# Patient Record
Sex: MALE | Race: WHITE | NOT HISPANIC OR LATINO
[De-identification: names, ages, dates, MRNs, and addresses within clinical notes are randomized per-mention and may not be internally consistent; named-entity substitution may affect disease eponyms.]

---

## 2021-10-27 ENCOUNTER — TRANSCRIPTION ENCOUNTER (OUTPATIENT)
Age: 76
End: 2021-10-27

## 2022-03-23 ENCOUNTER — TRANSCRIPTION ENCOUNTER (OUTPATIENT)
Age: 77
End: 2022-03-23

## 2024-01-24 ENCOUNTER — TRANSCRIPTION ENCOUNTER (OUTPATIENT)
Age: 79
End: 2024-01-24

## 2024-01-24 ENCOUNTER — INPATIENT (INPATIENT)
Facility: HOSPITAL | Age: 79
LOS: 1 days | Discharge: HOME | DRG: 308 | End: 2024-01-26
Attending: INTERNAL MEDICINE | Admitting: INTERNAL MEDICINE
Payer: MEDICARE

## 2024-01-24 VITALS
RESPIRATION RATE: 20 BRPM | OXYGEN SATURATION: 98 % | SYSTOLIC BLOOD PRESSURE: 143 MMHG | DIASTOLIC BLOOD PRESSURE: 89 MMHG | WEIGHT: 149.91 LBS | HEART RATE: 150 BPM | TEMPERATURE: 98 F

## 2024-01-24 DIAGNOSIS — K08.409 PARTIAL LOSS OF TEETH, UNSPECIFIED CAUSE, UNSPECIFIED CLASS: Chronic | ICD-10-CM

## 2024-01-24 DIAGNOSIS — Z98.890 OTHER SPECIFIED POSTPROCEDURAL STATES: Chronic | ICD-10-CM

## 2024-01-24 DIAGNOSIS — I10 ESSENTIAL (PRIMARY) HYPERTENSION: ICD-10-CM

## 2024-01-24 DIAGNOSIS — E78.5 HYPERLIPIDEMIA, UNSPECIFIED: ICD-10-CM

## 2024-01-24 DIAGNOSIS — I48.91 UNSPECIFIED ATRIAL FIBRILLATION: ICD-10-CM

## 2024-01-24 LAB
ALBUMIN SERPL ELPH-MCNC: 3.7 G/DL — SIGNIFICANT CHANGE UP (ref 3.3–5)
ALP SERPL-CCNC: 87 U/L — SIGNIFICANT CHANGE UP (ref 40–120)
ALT FLD-CCNC: 19 U/L — SIGNIFICANT CHANGE UP (ref 10–45)
ANION GAP SERPL CALC-SCNC: 14 MMOL/L — SIGNIFICANT CHANGE UP (ref 5–17)
APTT BLD: 31.2 SEC — SIGNIFICANT CHANGE UP (ref 24.5–35.6)
AST SERPL-CCNC: 28 U/L — SIGNIFICANT CHANGE UP (ref 10–40)
BASOPHILS # BLD AUTO: 0.06 K/UL — SIGNIFICANT CHANGE UP (ref 0–0.2)
BASOPHILS NFR BLD AUTO: 1 % — SIGNIFICANT CHANGE UP (ref 0–2)
BILIRUB SERPL-MCNC: 0.4 MG/DL — SIGNIFICANT CHANGE UP (ref 0.2–1.2)
BUN SERPL-MCNC: 15 MG/DL — SIGNIFICANT CHANGE UP (ref 7–23)
CALCIUM SERPL-MCNC: 9.1 MG/DL — SIGNIFICANT CHANGE UP (ref 8.4–10.5)
CHLORIDE SERPL-SCNC: 106 MMOL/L — SIGNIFICANT CHANGE UP (ref 96–108)
CK MB CFR SERPL CALC: 4.5 NG/ML — SIGNIFICANT CHANGE UP (ref 0–6.7)
CK SERPL-CCNC: 119 U/L — SIGNIFICANT CHANGE UP (ref 30–200)
CO2 SERPL-SCNC: 20 MMOL/L — LOW (ref 22–31)
CREAT SERPL-MCNC: 1.03 MG/DL — SIGNIFICANT CHANGE UP (ref 0.5–1.3)
EGFR: 75 ML/MIN/1.73M2 — SIGNIFICANT CHANGE UP
EOSINOPHIL # BLD AUTO: 0.25 K/UL — SIGNIFICANT CHANGE UP (ref 0–0.5)
EOSINOPHIL NFR BLD AUTO: 4.1 % — SIGNIFICANT CHANGE UP (ref 0–6)
GLUCOSE SERPL-MCNC: 92 MG/DL — SIGNIFICANT CHANGE UP (ref 70–99)
HCT VFR BLD CALC: 43.3 % — SIGNIFICANT CHANGE UP (ref 39–50)
HGB BLD-MCNC: 13.9 G/DL — SIGNIFICANT CHANGE UP (ref 13–17)
IMM GRANULOCYTES NFR BLD AUTO: 0.3 % — SIGNIFICANT CHANGE UP (ref 0–0.9)
INR BLD: 1.06 — SIGNIFICANT CHANGE UP (ref 0.85–1.18)
LYMPHOCYTES # BLD AUTO: 1.88 K/UL — SIGNIFICANT CHANGE UP (ref 1–3.3)
LYMPHOCYTES # BLD AUTO: 31.1 % — SIGNIFICANT CHANGE UP (ref 13–44)
MAGNESIUM SERPL-MCNC: 2 MG/DL — SIGNIFICANT CHANGE UP (ref 1.6–2.6)
MCHC RBC-ENTMCNC: 27.6 PG — SIGNIFICANT CHANGE UP (ref 27–34)
MCHC RBC-ENTMCNC: 32.1 GM/DL — SIGNIFICANT CHANGE UP (ref 32–36)
MCV RBC AUTO: 86.1 FL — SIGNIFICANT CHANGE UP (ref 80–100)
MONOCYTES # BLD AUTO: 0.72 K/UL — SIGNIFICANT CHANGE UP (ref 0–0.9)
MONOCYTES NFR BLD AUTO: 11.9 % — SIGNIFICANT CHANGE UP (ref 2–14)
NEUTROPHILS # BLD AUTO: 3.11 K/UL — SIGNIFICANT CHANGE UP (ref 1.8–7.4)
NEUTROPHILS NFR BLD AUTO: 51.6 % — SIGNIFICANT CHANGE UP (ref 43–77)
NRBC # BLD: 0 /100 WBCS — SIGNIFICANT CHANGE UP (ref 0–0)
NT-PROBNP SERPL-SCNC: 986 PG/ML — HIGH (ref 0–300)
PLATELET # BLD AUTO: 199 K/UL — SIGNIFICANT CHANGE UP (ref 150–400)
POTASSIUM SERPL-MCNC: 4.7 MMOL/L — SIGNIFICANT CHANGE UP (ref 3.5–5.3)
POTASSIUM SERPL-SCNC: 4.7 MMOL/L — SIGNIFICANT CHANGE UP (ref 3.5–5.3)
PROT SERPL-MCNC: 6.7 G/DL — SIGNIFICANT CHANGE UP (ref 6–8.3)
PROTHROM AB SERPL-ACNC: 12.1 SEC — SIGNIFICANT CHANGE UP (ref 9.5–13)
RBC # BLD: 5.03 M/UL — SIGNIFICANT CHANGE UP (ref 4.2–5.8)
RBC # FLD: 12.9 % — SIGNIFICANT CHANGE UP (ref 10.3–14.5)
SODIUM SERPL-SCNC: 140 MMOL/L — SIGNIFICANT CHANGE UP (ref 135–145)
TROPONIN T, HIGH SENSITIVITY RESULT: 17 NG/L — SIGNIFICANT CHANGE UP (ref 0–51)
TROPONIN T, HIGH SENSITIVITY RESULT: 18 NG/L — SIGNIFICANT CHANGE UP (ref 0–51)
WBC # BLD: 6.04 K/UL — SIGNIFICANT CHANGE UP (ref 3.8–10.5)
WBC # FLD AUTO: 6.04 K/UL — SIGNIFICANT CHANGE UP (ref 3.8–10.5)

## 2024-01-24 PROCEDURE — 99291 CRITICAL CARE FIRST HOUR: CPT

## 2024-01-24 PROCEDURE — 99223 1ST HOSP IP/OBS HIGH 75: CPT

## 2024-01-24 PROCEDURE — 93010 ELECTROCARDIOGRAM REPORT: CPT | Mod: 76

## 2024-01-24 PROCEDURE — 71045 X-RAY EXAM CHEST 1 VIEW: CPT | Mod: 26

## 2024-01-24 RX ORDER — SODIUM CHLORIDE 9 MG/ML
1000 INJECTION INTRAMUSCULAR; INTRAVENOUS; SUBCUTANEOUS ONCE
Refills: 0 | Status: COMPLETED | OUTPATIENT
Start: 2024-01-24 | End: 2024-01-24

## 2024-01-24 RX ORDER — METOPROLOL TARTRATE 50 MG
25 TABLET ORAL EVERY 12 HOURS
Refills: 0 | Status: DISCONTINUED | OUTPATIENT
Start: 2024-01-24 | End: 2024-01-26

## 2024-01-24 RX ORDER — DILTIAZEM HCL 120 MG
60 CAPSULE, EXT RELEASE 24 HR ORAL ONCE
Refills: 0 | Status: COMPLETED | OUTPATIENT
Start: 2024-01-24 | End: 2024-01-24

## 2024-01-24 RX ORDER — INFLUENZA VIRUS VACCINE 15; 15; 15; 15 UG/.5ML; UG/.5ML; UG/.5ML; UG/.5ML
0.7 SUSPENSION INTRAMUSCULAR ONCE
Refills: 0 | Status: DISCONTINUED | OUTPATIENT
Start: 2024-01-24 | End: 2024-01-26

## 2024-01-24 RX ORDER — DILTIAZEM HCL 120 MG
20 CAPSULE, EXT RELEASE 24 HR ORAL ONCE
Refills: 0 | Status: COMPLETED | OUTPATIENT
Start: 2024-01-24 | End: 2024-01-24

## 2024-01-24 RX ORDER — ATORVASTATIN CALCIUM 80 MG/1
20 TABLET, FILM COATED ORAL AT BEDTIME
Refills: 0 | Status: DISCONTINUED | OUTPATIENT
Start: 2024-01-25 | End: 2024-01-26

## 2024-01-24 RX ORDER — LOSARTAN POTASSIUM 100 MG/1
50 TABLET, FILM COATED ORAL DAILY
Refills: 0 | Status: DISCONTINUED | OUTPATIENT
Start: 2024-01-25 | End: 2024-01-26

## 2024-01-24 RX ORDER — APIXABAN 2.5 MG/1
5 TABLET, FILM COATED ORAL EVERY 12 HOURS
Refills: 0 | Status: DISCONTINUED | OUTPATIENT
Start: 2024-01-24 | End: 2024-01-24

## 2024-01-24 RX ORDER — APIXABAN 2.5 MG/1
5 TABLET, FILM COATED ORAL ONCE
Refills: 0 | Status: COMPLETED | OUTPATIENT
Start: 2024-01-24 | End: 2024-01-24

## 2024-01-24 RX ORDER — ATORVASTATIN CALCIUM 80 MG/1
1 TABLET, FILM COATED ORAL
Refills: 0 | DISCHARGE

## 2024-01-24 RX ORDER — METOPROLOL TARTRATE 50 MG
25 TABLET ORAL EVERY 12 HOURS
Refills: 0 | Status: DISCONTINUED | OUTPATIENT
Start: 2024-01-24 | End: 2024-01-24

## 2024-01-24 RX ORDER — APIXABAN 2.5 MG/1
5 TABLET, FILM COATED ORAL EVERY 12 HOURS
Refills: 0 | Status: DISCONTINUED | OUTPATIENT
Start: 2024-01-25 | End: 2024-01-26

## 2024-01-24 RX ADMIN — Medication 60 MILLIGRAM(S): at 12:15

## 2024-01-24 RX ADMIN — Medication 30 MILLILITER(S): at 21:01

## 2024-01-24 RX ADMIN — Medication 20 MILLIGRAM(S): at 12:04

## 2024-01-24 RX ADMIN — SODIUM CHLORIDE 1000 MILLILITER(S): 9 INJECTION INTRAMUSCULAR; INTRAVENOUS; SUBCUTANEOUS at 12:15

## 2024-01-24 RX ADMIN — Medication 25 MILLIGRAM(S): at 19:05

## 2024-01-24 RX ADMIN — APIXABAN 5 MILLIGRAM(S): 2.5 TABLET, FILM COATED ORAL at 14:34

## 2024-01-24 NOTE — ED ADULT NURSE NOTE - OBJECTIVE STATEMENT
Patient PMH high cholesterol to the ED c/o exertional weakness and sob x 1 week. Presented to urgent care today and found in atrial fib, denies hx. Denies chest pain, speaking in complete sentences. Ambulatory, AAOX4, NAD.

## 2024-01-24 NOTE — ED PROVIDER NOTE - CARDIAC, MLM
Normal rate, regular rhythm.  Heart sounds S1, S2.  No murmurs, rubs or gallops. Tachycardic and irregular

## 2024-01-24 NOTE — PATIENT PROFILE ADULT - FALL HARM RISK - UNIVERSAL INTERVENTIONS
Bed in lowest position, wheels locked, appropriate side rails in place/Call bell, personal items and telephone in reach/Instruct patient to call for assistance before getting out of bed or chair/Non-slip footwear when patient is out of bed/[place] to call system/Physically safe environment - no spills, clutter or unnecessary equipment/Purposeful Proactive Rounding/Room/bathroom lighting operational, light cord in reach

## 2024-01-24 NOTE — ED PROVIDER NOTE - PSYCHIATRIC, MLM
Alert and oriented to person, place, time/situation. normal mood and affect. no apparent risk to self or others. Alert and oriented. normal mood and affect.

## 2024-01-24 NOTE — H&P ADULT - NSHPLABSRESULTS_GEN_ALL_CORE
LABS:                          13.9   6.04  )-----------( 199      ( 24 Jan 2024 12:08 )             43.3     01-24    140  |  106  |  15  ----------------------------<  92  4.7   |  20<L>  |  1.03    Ca    9.1      24 Jan 2024 12:08  Mg     2.0     01-24    TPro  6.7  /  Alb  3.7  /  TBili  0.4  /  DBili  x   /  AST  28  /  ALT  19  /  AlkPhos  87  01-24    LIVER FUNCTIONS - ( 24 Jan 2024 12:08 )  Alb: 3.7 g/dL / Pro: 6.7 g/dL / ALK PHOS: 87 U/L / ALT: 19 U/L / AST: 28 U/L / GGT: x           PT/INR - ( 24 Jan 2024 12:08 )   PT: 12.1 sec;   INR: 1.06          PTT - ( 24 Jan 2024 12:08 )  PTT:31.2 sec  Urinalysis Basic - ( 24 Jan 2024 12:08 )    Color: x / Appearance: x / SG: x / pH: x  Gluc: 92 mg/dL / Ketone: x  / Bili: x / Urobili: x   Blood: x / Protein: x / Nitrite: x   Leuk Esterase: x / RBC: x / WBC x   Sq Epi: x / Non Sq Epi: x / Bacteria: x      EKG (1/24/24): Aflutter @76 bpm

## 2024-01-24 NOTE — H&P ADULT - CARDIOVASCULAR
normal/regular rate and rhythm/S1 S2 present/no gallops/no rub/no murmur/no pedal edema/vascular S1 S2 present/no gallops/no rub/no murmur/no pedal edema/Irregularly irregular rhythm/vascular

## 2024-01-24 NOTE — PROGRESS NOTE ADULT - SUBJECTIVE AND OBJECTIVE BOX
Anesthesiology Consult    Pt is a 79yo M w/ a PMH of HTN, AFib with NKDA who presented to the ED with AFib RVR scheduled for cardioversion. Anesthesia consulted due to the patient's issue with anesthesia in the past. Pt reports after his R tibia surgery 40 years ago, pt was unable to move his whole body after surgery for a day including his upper and lower extremities. Pt also reports after a dental procedure where he received local he was unable to open his mouth for 2 weeks. He has had hernia surgery and other dental procedures without issues. Discussion had with the pt regarding anesthesia and it's safety. All questions and concerns addressed, and pt is amendable to MAC required for cardioversion.

## 2024-01-24 NOTE — H&P ADULT - PROBLEM SELECTOR PLAN 3
Follow up lipid panel   - Continue: Atorvastatin 20 mg QHS (home med)     DVT ppx: Eliquis 5 mg BID  F: s/p 1L NS in ED, tolerating PO intake, NPO for possible testing 1/25/24   E: Keep K > 4, Mg > 2  N: DASH/TLC     Code: Full  Dispo: pending clinical progression     Case discussed with Dr. Gary.

## 2024-01-24 NOTE — H&P ADULT - NS ATTEND AMEND GEN_ALL_CORE FT
78-year-old male, PMHx HTN, HLD, admitted for cardiac telemetry for treatment of arrhythmia.    1. Atrial flutter variable block  Presented symptomatic, responded to CCB, HR 70s, variable block. Discussed NAN/DDCV vs RFA. Does not want to have any anesthesia.  Consult anesthesia and EP.  High chadsvasc score, eliquis 5 mg bid.  Echo and CT JOSE protocol in AM if decides against anesthesia - if no thrombus amiodarone.  if ok with anesthesia, cancel ct, NAN/DCCV vs rfa.

## 2024-01-24 NOTE — ED PROVIDER NOTE - OBJECTIVE STATEMENT
78-year-old male with past medical history of hypertension [losartan], HLD [atorvastatin], denies any other medical history or medications, presents today with palpitations and dyspnea on exertion for 1 week that has gradually been worsening.  [facility] and was found to be in A-fib with RVR in 130s and was sent to [facility] ED for further evaluation. Denies any history of  chest atrial fibrillation or cardiac arrhythmia. Patient denies any chest pain, abd pain, fevers, nausea, vomiting, diarrhea, URI symptoms or any recent illness.  Patient denies any CAD or CHF.  Notes that he had a cardiac echo a few months ago that was normal.  No other complaints.

## 2024-01-24 NOTE — H&P ADULT - HISTORY OF PRESENT ILLNESS
78-year-old male, PMHx HTN, HLD, who was sent to [facility] ED in AFib with RVR. Patient reports that he has been experiencing intermittent palpitations and dyspnea on significant exertion over the last one week, prompting him to report to urgent care today. He was found to be in Afib with RVR to 130s bpm at urgent care and was sent to [facility] ED for further evaluation. Patient denies CP, SOB, dizziness, orthopnea/PND, leg swelling, LOC, bleeding, melena/hematochezia, fever, chills, URI symptoms, or recent illness. Patient reports that he follows with cardiologist Dr. Eden and underwent stress test/ECHO approximately six months ago, both of which were reportedly unremarkable. He does not recall history of a cardiac arrhythmia in the past.     ED Course:  - Vitals: 97.6F, HR 76 bpm, RR 17 breaths/min, /66, spO2 97% on  room air   - Diagnostics: EKG (1/24/24): Aflutter @76 bpm; CXR (1/24/24, prelim): cardiomegaly, otherwise no apparent congestion   - Labs: WBC 6.04, H/H 13.9/43.3, , Na 140, K 4.7, BUN/Cr 15/1.03   - Interventions: Cardizem 20 mg IVPx1, Cardizem 60 mg POx1, IV NS 1L bolus

## 2024-01-24 NOTE — ED ADULT TRIAGE NOTE - CHIEF COMPLAINT QUOTE
pt presents to ER c/o generalized weakness for the past week. denies any chest pain, dizziness, palpitations, sob, and fevers.

## 2024-01-24 NOTE — ED PROVIDER NOTE - CLINICAL SUMMARY MEDICAL DECISION MAKING FREE TEXT BOX
78-year-old male with past medical history of hypertension [losartan], HLD [atorvastatin], denies any other medical history or medications, presents today with palpitations and dyspnea on exertion for 1 week that has gradually been worsening.  [facility] and was found to be in A-fib with RVR in 130s and was sent to [facility] ED for further evaluation. Denies any history of  chest atrial fibrillation or cardiac arrhythmia. Patient denies any chest pain, abd pain, fevers, nausea, vomiting, diarrhea, URI symptoms or any recent illness.  Patient denies any CAD or CHF.  Notes that he had a cardiac echo a few months ago that was normal.  No other complaints.    ED course: 78-year-old male with past medical history of hypertension [losartan], HLD [atorvastatin], denies any other medical history or medications, presents today with palpitations and dyspnea on exertion for 1 week that has gradually been worsening.  [facility] and was found to be in A-fib with RVR in 130s and was sent to [facility] ED for further evaluation. Denies any history of  chest atrial fibrillation or cardiac arrhythmia. Patient denies any chest pain, abd pain, fevers, nausea, vomiting, diarrhea, URI symptoms or any recent illness.  Patient denies any CAD or CHF.  Notes that he had a cardiac echo a few months ago that was normal.  No other complaints.    ED course: VS noted. Pt tachycardic, afebrile. ECG with afib with RVR in 130s. Pt given IV and po Cardizem with HR improving to 80s, remained in afib. Eliquis given and patient admitted to cardiology. Labs/studies noted. 78-year-old male with past medical history of hypertension [losartan], HLD [atorvastatin], denies any other medical history or medications, presents today with palpitations and dyspnea on exertion for 1 week that has gradually been worsening.  [facility] and was found to be in A-fib with RVR in 130s and was sent to [facility] ED for further evaluation. Denies any history of  chest atrial fibrillation or cardiac arrhythmia. Patient denies any chest pain, abd pain, fevers, nausea, vomiting, diarrhea, URI symptoms or any recent illness.  Patient denies any CAD or CHF.  Notes that he had a cardiac echo a few months ago that was normal.  No other complaints.    ED course: VS noted. Pt tachycardic, afebrile. ECG with afib with RVR in 130s. Pt given IV and po Cardizem with HR improving to 80s, remained in afib. Labs/studies noted. Eliquis given and patient admitted to cardiology. Labs/studies noted.

## 2024-01-24 NOTE — H&P ADULT - PROBLEM SELECTOR PLAN 1
Patient p/w afib w/ RVR to 130s, currently rate-controlled to 80s, asymptomatic   - EKG (1/24/24): AFlutter @ 76 bpm, no apparent ischemic changes  - CXR (1/24/24, prelim): cardiomegaly; no apparent congestion   - Rate-control: Toprol XL Patient p/w new-onset Afib w/ RVR to 130s, currently rate-controlled to 80s, asymptomatic   - EKG (1/24/24): AFlutter @ 76 bpm, no apparent ischemic changes  - CXR (1/24/24, prelim): cardiomegaly; no apparent congestion   - Rate-control: Lopressor 25 mg BID  - Rhythm-control: pending anesthesia clearance for sedation for NAN/DCCV  - Anticoagulation: Eliquis 5 mg BID  - Follow up cardiac risk labs   - EP consulted, f/u recs   - Continue: telemetry monitoring, vital signs Patient p/w new-onset Afib w/ RVR to 130s, currently rate-controlled to 80s, asymptomatic   - EPRGe1CVBi: 3   - EKG (1/24/24): AFlutter @ 76 bpm, no apparent ischemic changes  - CXR (1/24/24, prelim): cardiomegaly; no apparent congestion   - Rate-control: Lopressor 25 mg BID  - Rhythm-control: pending anesthesia clearance for sedation for NAN/DCCV  - Anticoagulation: Eliquis 5 mg BID  - Follow up cardiac risk labs   - EP consulted, f/u recs   - Continue: telemetry monitoring, vital signs

## 2024-01-24 NOTE — H&P ADULT - ASSESSMENT
78-year-old male, PMHx HTN, HLD, admitted for cardiac telemetry for treatment of Afib w/ RVR. Pending anesthesia consult for sedation candidacy in light of unclear allergy.

## 2024-01-25 ENCOUNTER — TRANSCRIPTION ENCOUNTER (OUTPATIENT)
Age: 79
End: 2024-01-25

## 2024-01-25 LAB
ALBUMIN SERPL ELPH-MCNC: 4.2 G/DL — SIGNIFICANT CHANGE UP (ref 3.3–5)
ALP SERPL-CCNC: 93 U/L — SIGNIFICANT CHANGE UP (ref 40–120)
ALT FLD-CCNC: 21 U/L — SIGNIFICANT CHANGE UP (ref 10–45)
ANION GAP SERPL CALC-SCNC: 7 MMOL/L — SIGNIFICANT CHANGE UP (ref 5–17)
APTT BLD: 36.9 SEC — HIGH (ref 24.5–35.6)
AST SERPL-CCNC: 25 U/L — SIGNIFICANT CHANGE UP (ref 10–40)
BASOPHILS # BLD AUTO: 0.06 K/UL — SIGNIFICANT CHANGE UP (ref 0–0.2)
BASOPHILS NFR BLD AUTO: 1 % — SIGNIFICANT CHANGE UP (ref 0–2)
BILIRUB SERPL-MCNC: 0.6 MG/DL — SIGNIFICANT CHANGE UP (ref 0.2–1.2)
BUN SERPL-MCNC: 15 MG/DL — SIGNIFICANT CHANGE UP (ref 7–23)
CALCIUM SERPL-MCNC: 9.5 MG/DL — SIGNIFICANT CHANGE UP (ref 8.4–10.5)
CHLORIDE SERPL-SCNC: 109 MMOL/L — HIGH (ref 96–108)
CHOLEST SERPL-MCNC: 141 MG/DL — SIGNIFICANT CHANGE UP
CO2 SERPL-SCNC: 28 MMOL/L — SIGNIFICANT CHANGE UP (ref 22–31)
CREAT SERPL-MCNC: 1.08 MG/DL — SIGNIFICANT CHANGE UP (ref 0.5–1.3)
EGFR: 71 ML/MIN/1.73M2 — SIGNIFICANT CHANGE UP
EOSINOPHIL # BLD AUTO: 0.28 K/UL — SIGNIFICANT CHANGE UP (ref 0–0.5)
EOSINOPHIL NFR BLD AUTO: 4.6 % — SIGNIFICANT CHANGE UP (ref 0–6)
GLUCOSE SERPL-MCNC: 100 MG/DL — HIGH (ref 70–99)
HCT VFR BLD CALC: 45.7 % — SIGNIFICANT CHANGE UP (ref 39–50)
HCV AB S/CO SERPL IA: 0.03 S/CO — SIGNIFICANT CHANGE UP
HCV AB SERPL-IMP: SIGNIFICANT CHANGE UP
HDLC SERPL-MCNC: 53 MG/DL — SIGNIFICANT CHANGE UP
HGB BLD-MCNC: 14.5 G/DL — SIGNIFICANT CHANGE UP (ref 13–17)
IMM GRANULOCYTES NFR BLD AUTO: 0.3 % — SIGNIFICANT CHANGE UP (ref 0–0.9)
INR BLD: 1.23 — HIGH (ref 0.85–1.18)
LIPID PNL WITH DIRECT LDL SERPL: 76 MG/DL — SIGNIFICANT CHANGE UP
LYMPHOCYTES # BLD AUTO: 1.61 K/UL — SIGNIFICANT CHANGE UP (ref 1–3.3)
LYMPHOCYTES # BLD AUTO: 26.4 % — SIGNIFICANT CHANGE UP (ref 13–44)
MAGNESIUM SERPL-MCNC: 2.2 MG/DL — SIGNIFICANT CHANGE UP (ref 1.6–2.6)
MCHC RBC-ENTMCNC: 27.5 PG — SIGNIFICANT CHANGE UP (ref 27–34)
MCHC RBC-ENTMCNC: 31.7 GM/DL — LOW (ref 32–36)
MCV RBC AUTO: 86.6 FL — SIGNIFICANT CHANGE UP (ref 80–100)
MONOCYTES # BLD AUTO: 0.64 K/UL — SIGNIFICANT CHANGE UP (ref 0–0.9)
MONOCYTES NFR BLD AUTO: 10.5 % — SIGNIFICANT CHANGE UP (ref 2–14)
NEUTROPHILS # BLD AUTO: 3.5 K/UL — SIGNIFICANT CHANGE UP (ref 1.8–7.4)
NEUTROPHILS NFR BLD AUTO: 57.2 % — SIGNIFICANT CHANGE UP (ref 43–77)
NON HDL CHOLESTEROL: 88 MG/DL — SIGNIFICANT CHANGE UP
NRBC # BLD: 0 /100 WBCS — SIGNIFICANT CHANGE UP (ref 0–0)
PLATELET # BLD AUTO: 224 K/UL — SIGNIFICANT CHANGE UP (ref 150–400)
POTASSIUM SERPL-MCNC: 4.9 MMOL/L — SIGNIFICANT CHANGE UP (ref 3.5–5.3)
POTASSIUM SERPL-SCNC: 4.9 MMOL/L — SIGNIFICANT CHANGE UP (ref 3.5–5.3)
PROT SERPL-MCNC: 6.7 G/DL — SIGNIFICANT CHANGE UP (ref 6–8.3)
PROTHROM AB SERPL-ACNC: 13.9 SEC — HIGH (ref 9.5–13)
RBC # BLD: 5.28 M/UL — SIGNIFICANT CHANGE UP (ref 4.2–5.8)
RBC # FLD: 13.1 % — SIGNIFICANT CHANGE UP (ref 10.3–14.5)
SODIUM SERPL-SCNC: 144 MMOL/L — SIGNIFICANT CHANGE UP (ref 135–145)
TRIGL SERPL-MCNC: 62 MG/DL — SIGNIFICANT CHANGE UP
TSH SERPL-MCNC: 3.41 UIU/ML — SIGNIFICANT CHANGE UP (ref 0.27–4.2)
WBC # BLD: 6.11 K/UL — SIGNIFICANT CHANGE UP (ref 3.8–10.5)
WBC # FLD AUTO: 6.11 K/UL — SIGNIFICANT CHANGE UP (ref 3.8–10.5)

## 2024-01-25 PROCEDURE — 93010 ELECTROCARDIOGRAM REPORT: CPT

## 2024-01-25 PROCEDURE — 99233 SBSQ HOSP IP/OBS HIGH 50: CPT

## 2024-01-25 PROCEDURE — 93306 TTE W/DOPPLER COMPLETE: CPT | Mod: 26

## 2024-01-25 PROCEDURE — 92960 CARDIOVERSION ELECTRIC EXT: CPT

## 2024-01-25 PROCEDURE — 93312 ECHO TRANSESOPHAGEAL: CPT | Mod: 26

## 2024-01-25 PROCEDURE — 76377 3D RENDER W/INTRP POSTPROCES: CPT | Mod: 26

## 2024-01-25 PROCEDURE — 93325 DOPPLER ECHO COLOR FLOW MAPG: CPT | Mod: 26

## 2024-01-25 RX ORDER — APIXABAN 2.5 MG/1
1 TABLET, FILM COATED ORAL
Qty: 60 | Refills: 0
Start: 2024-01-25 | End: 2024-02-23

## 2024-01-25 RX ADMIN — LOSARTAN POTASSIUM 50 MILLIGRAM(S): 100 TABLET, FILM COATED ORAL at 05:54

## 2024-01-25 RX ADMIN — APIXABAN 5 MILLIGRAM(S): 2.5 TABLET, FILM COATED ORAL at 10:48

## 2024-01-25 RX ADMIN — APIXABAN 5 MILLIGRAM(S): 2.5 TABLET, FILM COATED ORAL at 23:52

## 2024-01-25 RX ADMIN — ATORVASTATIN CALCIUM 20 MILLIGRAM(S): 80 TABLET, FILM COATED ORAL at 23:52

## 2024-01-25 RX ADMIN — Medication 25 MILLIGRAM(S): at 05:54

## 2024-01-25 RX ADMIN — APIXABAN 5 MILLIGRAM(S): 2.5 TABLET, FILM COATED ORAL at 00:12

## 2024-01-25 NOTE — PROGRESS NOTE ADULT - PROBLEM SELECTOR PLAN 3
Continue: Atorvastatin 20 mg QHS      F: None  E: Replete if K<4 or Mag<2  N: DASH Diet  VTEppx: Eliquis  Dispo: Pending DCCV    Case discussed with Dr. Gary

## 2024-01-25 NOTE — DIETITIAN INITIAL EVALUATION ADULT - NS FNS DIET ORDER
Diet, NPO after Midnight:      NPO Start Date: 24-Jan-2024,   NPO Start Time: 23:59 (01-24-24 @ 17:16)  Diet, DASH/TLC:   Sodium & Cholesterol Restricted  Consistent Carbohydrate {No Snacks} (CSTCHO) (01-24-24 @ 16:21)

## 2024-01-25 NOTE — DISCHARGE NOTE PROVIDER - NSDCCPCAREPLAN_GEN_ALL_CORE_FT
PRINCIPAL DISCHARGE DIAGNOSIS  Diagnosis: Atrial fibrillation  Assessment and Plan of Treatment: You have an abnormal heart rhythm (arrhythmia) called atrial fibrillation. With this condition, the hearts 2 upper chambers (the atria) quiver rather than squeeze the blood out in a normal pattern. This leads to an irregular and sometimes rapid heartbeat. Atrial fibrillation is serious condition as it affects the heart’s ability to fill with blood, and the blood can start to form clots.  These clots can travel to the brain through the blood vessels, and cause strokes.    -Please CONTINUE  ELIQUIS 5MG TWICE A DAY to prevent a stroke by helping to prevent clots from forming.   -Please CONTINUE _____ TWICE A DAY to keep your heart rate regular  -Please CONTINUE _____ DAILY to keep your heart in normal rhythm  -Please follow up with  ___.        SECONDARY DISCHARGE DIAGNOSES  Diagnosis: HTN (hypertension)  Assessment and Plan of Treatment: Please continue ____ as listed to keep your blood pressure controlled. For blood pressure that is too high or too low please see your doctor or go to the emergency room as necessary.      Diagnosis: HLD (hyperlipidemia)  Assessment and Plan of Treatment: Please continue ____ at bedtime to keep your cholesterol low. High cholesterol contributes to heart disease.       PRINCIPAL DISCHARGE DIAGNOSIS  Diagnosis: Atrial fibrillation  Assessment and Plan of Treatment: You have an abnormal heart rhythm (arrhythmia) called atrial fibrillation. With this condition, the hearts 2 upper chambers (the atria) quiver rather than squeeze the blood out in a normal pattern. This leads to an irregular and sometimes rapid heartbeat. Atrial fibrillation is serious condition as it affects the heart’s ability to fill with blood, and the blood can start to form clots.  These clots can travel to the brain through the blood vessels, and cause strokes.    -Please CONTINUE  ELIQUIS 5MG TWICE A DAY to prevent a stroke by helping to prevent clots from forming.   -Please CONTINUE _____ TWICE A DAY to keep your heart rate regular  -Please CONTINUE _____ DAILY to keep your heart in normal rhythm  -Please follow up with  ___.        SECONDARY DISCHARGE DIAGNOSES  Diagnosis: HTN (hypertension)  Assessment and Plan of Treatment: Please continue ____ as listed to keep your blood pressure controlled. For blood pressure that is too high or too low please see your doctor or go to the emergency room as necessary.      Diagnosis: HLD (hyperlipidemia)  Assessment and Plan of Treatment: Please continue atorvastatin 20mg at bedtime to keep your cholesterol low. High cholesterol contributes to heart disease.       PRINCIPAL DISCHARGE DIAGNOSIS  Diagnosis: Atrial fibrillation  Assessment and Plan of Treatment: You have an abnormal heart rhythm (arrhythmia) called atrial fibrillation. With this condition, the hearts 2 upper chambers (the atria) quiver rather than squeeze the blood out in a normal pattern. This leads to an irregular and sometimes rapid heartbeat. Atrial fibrillation is serious condition as it affects the heart’s ability to fill with blood, and the blood can start to form clots.  These clots can travel to the brain through the blood vessels, and cause strokes.    You underwent a cardioversion and an ablation procedure to get you out of this abnormal rhythm and to ensure that you dont revert back to this abnormal rhythm.   -Please CONTINUE  ELIQUIS 5MG TWICE A DAY to prevent a stroke by helping to prevent clots from forming.   -Please CONTINUE _____ TWICE A DAY to keep your heart rate regular  -Please CONTINUE _____ DAILY to keep your heart in normal rhythm  -Please follow up with  ___.        SECONDARY DISCHARGE DIAGNOSES  Diagnosis: HTN (hypertension)  Assessment and Plan of Treatment: Please continue ____ as listed to keep your blood pressure controlled. For blood pressure that is too high or too low please see your doctor or go to the emergency room as necessary.      Diagnosis: HLD (hyperlipidemia)  Assessment and Plan of Treatment: Please continue atorvastatin 20mg at bedtime to keep your cholesterol low. High cholesterol contributes to heart disease.      Diagnosis: Mitral regurgitation  Assessment and Plan of Treatment:      PRINCIPAL DISCHARGE DIAGNOSIS  Diagnosis: Atrial fibrillation  Assessment and Plan of Treatment: You have an abnormal heart rhythm (arrhythmia) called atrial fibrillation. With this condition, the hearts 2 upper chambers (the atria) quiver rather than squeeze the blood out in a normal pattern. This leads to an irregular and sometimes rapid heartbeat. Atrial fibrillation is serious condition as it affects the heart’s ability to fill with blood, and the blood can start to form clots.  These clots can travel to the brain through the blood vessels, and cause strokes.    You underwent a cardioversion to get you out of this abnormal rhythm and to ensure that you dont revert back to this abnormal rhythm. You were advised ablation procedure but you declined to have more time to discuss this with your cardiologist.   -Please CONTINUE  ELIQUIS 5MG TWICE A DAY to prevent a stroke by helping to prevent clots from forming.   -Please start Metoprolol 25mg once a day to keep your heart rate controlled.   -Please follow up with Dr. Eden in 1-2 weeks to discuss further regarding ablation procedure and further options.        SECONDARY DISCHARGE DIAGNOSES  Diagnosis: HTN (hypertension)  Assessment and Plan of Treatment: Please continue your Losartan 50mg once a day and stary Toprol 25mg once a day as listed to keep your blood pressure controlled. For blood pressure that is too high or too low please see your doctor or go to the emergency room as necessary.      Diagnosis: HLD (hyperlipidemia)  Assessment and Plan of Treatment: Please continue atorvastatin 20mg at bedtime to keep your cholesterol low. High cholesterol contributes to heart disease.      Diagnosis: Mitral regurgitation  Assessment and Plan of Treatment: You have a known leaky heart valve and will benefit from seeing Structural team for valvular disease and further management. Please call to follow up with Dr. Magallanes in next 2 weeks for further evaluation and management.    Diagnosis: Cardiomyopathy  Assessment and Plan of Treatment: You have a weak heart, also known as Congestive Heart Failure (CHF). Heart failure is a condition in which the heart does not pump or fill with blood well. As a result, the heart lags behind in its job of moving blood throughout the body. This can lead to symptoms such as swelling, trouble breathing, and feeling tired. Your Ejection Fraction (EF) is 40-45%, a normal EF is 55-60%.  -Avoid drinking more than 1.5L of fluid daily and maintain a low salt diet (max 2grams daily).  -Please weigh yourself daily, for any significant increases in daily weight of 2lbs/day or 5lbs/week with associated swelling in the legs or abdomen and/or shortness of breath, please call your doctor or go to the emergency room.  -Follow up with  _________ in 1 week.       PRINCIPAL DISCHARGE DIAGNOSIS  Diagnosis: Atrial fibrillation  Assessment and Plan of Treatment: You have an abnormal heart rhythm (arrhythmia) called atrial fibrillation. With this condition, the hearts 2 upper chambers (the atria) quiver rather than squeeze the blood out in a normal pattern. This leads to an irregular and sometimes rapid heartbeat. Atrial fibrillation is serious condition as it affects the heart’s ability to fill with blood, and the blood can start to form clots.  These clots can travel to the brain through the blood vessels, and cause strokes.    You underwent a cardioversion to get you out of this abnormal rhythm and to ensure that you dont revert back to this abnormal rhythm. You were advised ablation procedure but you declined to have more time to discuss this with your cardiologist.   -Please CONTINUE  ELIQUIS 5MG TWICE A DAY to prevent a stroke by helping to prevent clots from forming.   -Please start Metoprolol Succinate 50mg once a day to keep your heart rate controlled.   -Please follow up with Dr. Eden in 1-2 weeks to discuss further regarding ablation procedure and further options.        SECONDARY DISCHARGE DIAGNOSES  Diagnosis: HTN (hypertension)  Assessment and Plan of Treatment: Please stop Losartan and start Entresto 24/26mg twice a day once a day and start Metoprolol 50mg once a day as listed to keep your blood pressure controlled. For blood pressure that is too high or too low please see your doctor or go to the emergency room as necessary.      Diagnosis: HLD (hyperlipidemia)  Assessment and Plan of Treatment: Please continue atorvastatin 20mg at bedtime to keep your cholesterol low. High cholesterol contributes to heart disease.      Diagnosis: Mitral regurgitation  Assessment and Plan of Treatment: You have a known leaky heart valve and will benefit from seeing Structural team for valvular disease and further management. Please call to follow up with Dr. Magallanes in next 2 weeks for further evaluation and management.    Diagnosis: Cardiomyopathy  Assessment and Plan of Treatment: You have a weak heart, also known as Congestive Heart Failure (CHF). Heart failure is a condition in which the heart does not pump or fill with blood well. As a result, the heart lags behind in its job of moving blood throughout the body. This can lead to symptoms such as swelling, trouble breathing, and feeling tired. Your Ejection Fraction (EF) is 40-45%, a normal EF is 55-60%.  Please start Entresto 24/26mg twice a day, Farxiga 10mg once a day, and Metoprolol 50mg once a day.   -Avoid drinking more than 1.5L of fluid daily and maintain a low salt diet (max 2grams daily).  -Please weigh yourself daily, for any significant increases in daily weight of 2lbs/day or 5lbs/week with associated swelling in the legs or abdomen and/or shortness of breath, please call your doctor or go to the emergency room.  -Follow up with Dr. Eden in 1-2 week.

## 2024-01-25 NOTE — DIETITIAN INITIAL EVALUATION ADULT - PERSON TAUGHT/METHOD
- Educated about heart Reviewed general healthful diet. diet, provided handout.  - Educated and counseled patient, devising different approaches to avoid unhealthy snacking.  - Imparted education about healthy snack options./verbal instruction/written material/patient instructed - Educated about heart-healthy diet, provided handout.  - Educated and counseled patient, devising different approaches to avoid unhealthy snacking.  - Imparted education about healthy snack options./verbal instruction/written material/patient instructed - Educated about heart-healthy diet, provided handout. Reviewed general healthful diet.   - Educated and counseled patient, devising different approaches to avoid unhealthy snacking.  - Imparted education about healthy snack options./verbal instruction/written material/patient instructed

## 2024-01-25 NOTE — DIETITIAN INITIAL EVALUATION ADULT - OTHER CALCULATIONS
Based on Standards of Care pt within % IBW (  pounds, 120%) thus actual body weight used for all calculations. Needs adjusted for advanced age.  Based on Standards of Care pt within % IBW (  pounds, 120%) thus actual body weight used for all calculations. Needs adjusted for advanced age. Recommend upper to higher end of the estimated calorie needs.

## 2024-01-25 NOTE — DIETITIAN INITIAL EVALUATION ADULT - PERTINENT MEDS FT
MEDICATIONS  (STANDING):  apixaban 5 milliGRAM(s) Oral every 12 hours  atorvastatin 20 milliGRAM(s) Oral at bedtime  influenza  Vaccine (HIGH DOSE) 0.7 milliLiter(s) IntraMuscular once  losartan 50 milliGRAM(s) Oral daily  metoprolol tartrate 25 milliGRAM(s) Oral every 12 hours    MEDICATIONS  (PRN):

## 2024-01-25 NOTE — DISCHARGE NOTE PROVIDER - CARE PROVIDER_API CALL
ORAL KRISHNAN  [address]  [place] [address]  Phone: [phone]  Fax: ()-  Follow Up Time: 2 weeks   ORAL KRISHNAN  [address]  [place] [address]  Phone: ([phone]  Fax: ()-  Follow Up Time: 2 weeks    Remberto Valentine  Cardiac Electrophysiology  [address] [place] [address]  Phone: [phone]  Fax: [phone]  Follow Up Time: 2 weeks   ORAL KRISHNAN  [address]  [place] [address]  Phone: ()-  Fax: ()-  Follow Up Time: 2 weeks    Remberto Valentine  Cardiac Electrophysiology  [address] [place] [address]  Phone: [phone]  Fax: [phone]  Follow Up Time: 2 weeks    Maxi Magallanes  Interventional Cardiology  [address]  [place] [address]  Phone: [phone]  Fax: [phone]  Follow Up Time: 2 weeks

## 2024-01-25 NOTE — DIETITIAN INITIAL EVALUATION ADULT - OTHER INFO
78-year-old male, PMHx HTN, HLD, admitted for cardiac telemetry for treatment of Afib w/ RVR. EP consulted, possible CT LA r/o thrombus and DCCV thereafter.     Patient seen at bedside for blank assessment. Current diet order: blank. NKFA. No difficulty chewing/swallowing reported. Reports blank appetite. Pt consumed blank % of breakfast which included blank. Food recall PTA. Nutrition education. Dosing weight: blank pounds, BMI, reports UBW of blank pounds. No pressure injuries documented. No edema documented. Denies N/V/D/C. Labs. Meds: blank. Observed with no overt signs and symptoms of muscle or fat wasting. Based on ASPEN guidelines, pt does not meet criteria for malnutrition. No cultural, ethnic, Hinduism food preferences noted. See nutrition recommendations below.  78-year-old male, PMHx HTN, HLD, who was sent to [facility] ED in AFib with RVR. Patient reports that he has been experiencing intermittent palpitations and dyspnea on significant exertion over the last one week, prompting him to report to urgent care today. He was found to be in Afib with RVR to 130s bpm at urgent care and was sent to [facility] ED for further evaluation.    Patient seen at bedside for nutrition assessment. Current diet order: DASH, consistent carbohydrate, NPO after midnight. Reports no known food allergies. No difficulty chewing/swallowing reported. Reports good appetite, consumed % of dinner last night which included hummus, servando platter, apple juice and apple tart. PTA, patient reports a good appetite, and his usual meals consist of pizza, salad, chicken, pasta, sausage, cheese burger, fish, cereal and cashew/almond/oat milk. Patient denies any intolerance for regular milk being the reason for his choice of milk alternatives. Dosing weight: 150 pounds, BMI 26.6, reports UBW of 150 pounds. Patient concerns his weight to have increased last couple of months, though same is not suggested by weights documented. No pressure injuries or edema documented. Denies N/V/D/C, last BM reported morning 01/24. Labs: Nutrition pertinent labs WNL, not tested for serum lipids (patient denied venipuncture). Meds: diltiazem, antacids. Observed with no overt signs and symptoms of muscle or fat wasting. Based on ASPEN guidelines, pt does not meet criteria for malnutrition. No cultural, ethnic, Sikhism food preferences noted. See nutrition recommendations below. 78-year-old male, PMHx HTN, HLD, who was sent to [facility] ED in AFib with RVR. Patient reports that he has been experiencing intermittent palpitations and dyspnea on significant exertion over the last one week, prompting him to report to urgent care today. He was found to be in Afib with RVR to 130s bpm at urgent care and was sent to [facility] ED for further evaluation.    Patient seen at bedside for nutrition assessment. Patient NPO (overnight, 1/24) at the time of assessment. Reports no known food allergies. No difficulty chewing/swallowing reported. Reports good appetite, consumed % of dinner last night which included hummus, servando platter, apple juice and apple tart. PTA, patient reports a good appetite, and his usual meals consist of pizza, salad, chicken, pasta, sausage, cheese burger, fish, cereal and cashew/almond/oat milk. Patient denies any intolerance for regular milk being the reason for his choice of milk alternatives. Patient reports binge cookies and unhealthy snacks lying around the house, which he finds difficult to convince his spouse against. Dosing weight: 150 pounds, BMI 26.6, reports UBW of 150 pounds. Patient concerns his weight to have increased last couple of months, though same is not suggested by weights documented. No pressure injuries or edema documented. Denies N/V/D/C, last BM reported morning 01/24. Labs: Nutrition pertinent labs WNL, not tested for serum lipids (patient denied venipuncture). Meds: diltiazem, antacids. Observed with no overt signs and symptoms of muscle or fat wasting. Based on ASPEN guidelines, pt does not meet criteria for malnutrition. No cultural, ethnic, Gnosticist food preferences noted. See nutrition recommendations below. 78-year-old male, PMHx HTN, HLD, who was sent to [facility] ED in AFib with RVR. Patient reports that he has been experiencing intermittent palpitations and dyspnea on significant exertion over the last one week, prompting him to report to urgent care today. He was found to be in Afib with RVR to 130s bpm at urgent care and was sent to [facility] ED for further evaluation.    Patient seen at bedside for nutrition assessment. Patient NPO@12 (1/24) at the time of assessment. Reports good appetite, consumed % of dinner last night which included hummus, servando platter, apple juice and apple tart. PTA, patient reports a good appetite, and his usual meals consist of pizza, salad, chicken, pasta, sausage, cheese burger, fish, cereal and cashew/almond/oat milk. Patient denies any intolerance for regular milk being the reason for his choice of milk alternatives. Reports no known food allergies. No difficulty chewing/swallowing reported. Patient sometimes overeating cookies and unhealthy snacks lying around the house per spouse preference. Dosing weight: 149 pounds, BMI 26.6, reports UBW of 150 pounds. Patient concerned for weight gain x last couple of months, though same is not suggested by weights documented. No pressure injuries or edema documented. Denies N/V/D/C, last BM reported morning 01/24. Labs: Nutrition pertinent labs WNL, not tested for serum lipids (patient denied venipuncture). Meds: diltiazem, antacids. Observed with no overt signs and symptoms of muscle or fat wasting. Based on ASPEN guidelines, pt does not meet criteria for malnutrition. No cultural, ethnic, Nondenominational food preferences noted.  See nutrition recommendations below.

## 2024-01-25 NOTE — DISCHARGE NOTE PROVIDER - NSDCFUADDAPPT_GEN_ALL_CORE_FT
Please follow up with your cardiologist Dr. Eden in 1-2 weeks after discharge.  Please follow up with EP Dr. Valentine in 2 weeks after discharge.     Please follow up with your cardiologist Dr. Eden in 1-2 weeks after discharge.  Please follow up with EP Dr. Valentine in 2 weeks after discharge.     Please follow up with your cardiologist Dr. Eden in 1-2 weeks after discharge.     Please

## 2024-01-25 NOTE — DIETITIAN INITIAL EVALUATION ADULT - DIET TYPE
Per serum glucose levels, carbohydrate consistent diet is not warranted/DASH/TLC (sodium and cholesterol restricted diet)/1000ml Per serum glucose levels, carbohydrate consistent diet is not warranted/DASH/TLC (sodium and cholesterol restricted diet)

## 2024-01-25 NOTE — PROGRESS NOTE ADULT - NS ATTEND AMEND GEN_ALL_CORE FT
78-year-old male, PMHx HTN, HLD, admitted for cardiac telemetry for treatment of arrhythmia.    1. Atrial flutter variable block  s/p NAN DCCV with restoration of sinus rhythm.  Continue eliquis 5 mg bid.  RFA tomorrow.    2. HFrEF, acute  Newly diagnosed HFrEF, possibly tachy-mediated but may have a component of MR. Suggest ischemic evaluation, initiation of GDMT.    3. Severe MR  Moderate to severe MR on NAN, appears mixed etiology with some component of degenerative MR and atrial dilation (atrial MR).   Plan to start GDMT, consult [facility] as outpatient if no improvement for consideration of MILENA MVR -not urgent.

## 2024-01-25 NOTE — CHART NOTE - NSCHARTNOTEFT_GEN_A_CORE
[facility] BRIEF OP NOTE    JAIRO LUNDBERG  [id number]    PROCEDURE:  - DCCV    INDICATION:  - AF    ELECTROPHYSIOLOGIST(S):  - Dr. Valentine (Attending)  - Dr. Castro (Fellow)    ANESTHESIOLOGY:  - Dr. Romero    SEDATION TYPE:  - MAC    FINDINGS:  - Successful cardioversion to sinus rhythm following NAN guided clearing of the JOSE with a single synchronized shock at 120J    COMPLICATIONS:  - None    RECOMMENDATIONS:  - Continue uninterrupted anticoagulation      Please refer to the full report to follow in [facility] [place] for a detailed description of this case.    --  Inder Castro MD  Electrophysiology PGY8

## 2024-01-25 NOTE — DIETITIAN INITIAL EVALUATION ADULT - SIGNS/SYMPTOMS
as evidenced by reported environment and behaviors which prevent a healthy diet  as evidenced by verbal reports as evidenced by verbal reports, Diet Recall

## 2024-01-25 NOTE — DISCHARGE NOTE PROVIDER - NSDCMRMEDTOKEN_GEN_ALL_CORE_FT
atorvastatin 20 mg oral tablet: 1 tab(s) orally once a day (at bedtime)  losartan 50 mg oral tablet: 1 tab(s) orally once a day   atorvastatin 20 mg oral tablet: 1 tab(s) orally once a day (at bedtime)  Eliquis 5 mg oral tablet: 1 tab(s) orally 2 times a day  losartan 50 mg oral tablet: 1 tab(s) orally once a day   atorvastatin 20 mg oral tablet: 1 tab(s) orally once a day (at bedtime)  Eliquis 5 mg oral tablet: 1 tab(s) orally 2 times a day  Entresto 24 mg-26 mg oral tablet: 1 tab(s) orally 2 times a day  Farxiga 10 mg oral tablet: 1 tab(s) orally once a day  losartan 50 mg oral tablet: 1 tab(s) orally once a day   atorvastatin 20 mg oral tablet: 1 tab(s) orally once a day (at bedtime)  Eliquis 5 mg oral tablet: 1 tab(s) orally 2 times a day  Entresto 24 mg-26 mg oral tablet: 1 tab(s) orally 2 times a day  Farxiga 10 mg oral tablet: 1 tab(s) orally once a day  Toprol-XL 50 mg oral tablet, extended release: 1 tab(s) orally once a day

## 2024-01-25 NOTE — DISCHARGE NOTE PROVIDER - HOSPITAL COURSE
INCOMPLETE       ***INCOMPLETE    78-year-old male, PMHx HTN, HLD, who was sent to [facility] ED in AFib with RVR. Patient reports that he has been experiencing intermittent palpitations and dyspnea on significant exertion over the last one week, prompting him to report to urgent care today. He was found to be in Afib with RVR to 130s bpm at urgent care and was sent to [facility] ED for further evaluation. Patient reports that he follows with cardiologist Dr. Eden and underwent stress test/ECHO approximately six months ago, both of which were reportedly unremarkable. He does not recall history of a cardiac arrhythmia in the past. EKG 1/24/24 revealed aflutter @76 bpm, CXR 1/24/24 revealed cardiomegaly, otherwise no apparent congestion. In ED, labs significant for: WBC 6.04, H/H 13.9/43.3, , Na 140, K 4.7, BUN/Cr 15/1.03. Patient was given Cardizem 20 mg IVPx1, Cardizem 60 mg POx1, IV NS 1L bolus in ED.  Pt receiving Lopressor 25 mg BID for rate control and Eliquis 5mg BID for anticoagulation. Patient reports hx of allergic rxn to general and local anesthesia where he was unable to move for 1 day after receiving it - patient unsure which medications caused this. Anesthesia consulted, pt cleared for NAN & DCCV with EP 1/25/24.        ***INCOMPLETE    78-year-old male, PMHx HTN, HLD, who was sent to [facility] ED in AFib with RVR. Patient reports that he has been experiencing intermittent palpitations and dyspnea on significant exertion over the last one week, prompting him to report to urgent care today. He was found to be in Afib with RVR to 130s bpm at urgent care and was sent to [facility] ED for further evaluation. Patient reports that he follows with cardiologist Dr. Eden and underwent stress test/ECHO approximately six months ago, both of which were reportedly unremarkable. He does not recall history of a cardiac arrhythmia in the past. EKG 1/24/24 revealed aflutter @76 bpm, CXR 1/24/24 revealed cardiomegaly, otherwise no apparent congestion. Patient was given Cardizem 20 mg IVPx1, Cardizem 60 mg POx1, IV NS 1L bolus in ED and admitted to cardiac tele for further management of afib w RVR. Pt was rate controlled with Lopressor 25mg BID and initiated on Eliquis 5mg BID for AC. Pt reports of a remote hx of adverse reaction to general and local anesthesia (unsure what medications causes this) for which anesthesiology was consulted w no contraindications to undego MAC for NAN/DCCV. Pt is now s/p NAN/DCCV on 1/25/24 ___. TTE 1/25/24: Mildly reduced LVSF EF 40-45%. Normal RV size/fctn. Severely dilated RA. Dilated RA. Mod eccentric MR. PAST 42 mmHg.        ***INCOMPLETE    78-year-old male, PMHx HTN, HLD, who was sent to [facility] ED in AFib with RVR. Patient reports that he has been experiencing intermittent palpitations and dyspnea on significant exertion over the last one week, prompting him to report to urgent care today. He was found to be in Afib with RVR to 130s bpm at urgent care and was sent to [facility] ED for further evaluation. Patient reports that he follows with cardiologist Dr. Eden and underwent stress test/ECHO approximately six months ago, both of which were reportedly unremarkable. He does not recall history of a cardiac arrhythmia in the past. EKG 1/24/24 revealed aflutter @76 bpm, CXR 1/24/24 revealed cardiomegaly, otherwise no apparent congestion. Patient was given Cardizem 20 mg IVPx1, Cardizem 60 mg POx1, IV NS 1L bolus in ED and admitted to cardiac tele for further management of afib w RVR. Pt was rate controlled with Lopressor 25mg BID and initiated on Eliquis 5mg BID for AC. Pt reports of a remote hx of adverse reaction to general and local anesthesia (unsure what medications causes this) for which anesthesiology was consulted w no contraindications to undergo MAC for NAN/DCCV. Pt is now s/p NAN/DCCV on 1/25/24 and subsequent ablation on 1/23/24.      TTE 1/25/24: Mildly reduced LVSF EF 40-45%. Normal RV size/fctn. Severely dilated RA. Dilated RA. Mod eccentric MR. PAST 42 mmHg. NAN 1/25/24: Mildly reduced LVSF. ELIJAH. No LA/RA/JOSE/RAA thrombus seen. Mod-severe eccentric posteriorly directed MR. Mild-mod TR. PASP 43 mmHg. Mod non-mobile plaque seen in the visualized portion of the descending aorta + aortic arch. HF consulted for GDMT guidance. Structural consulted for new mod-severe MR.      78-year-old male, PMHx HTN, HLD, who was sent to [facility] ED in AFib with RVR. Patient reports that he has been experiencing intermittent palpitations and dyspnea on significant exertion over the last one week, prompting him to report to urgent care today. He was found to be in Afib with RVR to 130s bpm at urgent care and was sent to [facility] ED for further evaluation. Patient reports that he follows with cardiologist Dr. Eden and underwent stress test/ECHO approximately six months ago, both of which were reportedly unremarkable. He does not recall history of a cardiac arrhythmia in the past. EKG 1/24/24 revealed aflutter @76 bpm, CXR 1/24/24 revealed cardiomegaly, otherwise no apparent congestion. Patient was given Cardizem 20 mg IVPx1, Cardizem 60 mg POx1, IV NS 1L bolus in ED and admitted to cardiac tele for further management of afib w RVR. Pt was rate controlled with Lopressor 25mg BID and initiated on Eliquis 5mg BID for AC. Pt reports of a remote hx of adverse reaction to general and local anesthesia (unsure what medications causes this) for which anesthesiology was consulted w no contraindications to undergo MAC for NAN/DCCV. Pt is now s/p NAN/DCCV on 1/25/24 and subsequent ablation on 1/23/24.      TTE 1/25/24: Mildly reduced LVSF EF 40-45%. Normal RV size/fctn. Severely dilated RA. Dilated RA. Mod eccentric MR. PAST 42 mmHg. NAN 1/25/24: Mildly reduced LVSF. ELIJAH. No LA/RA/JOSE/RAA thrombus seen. Mod-severe eccentric posteriorly directed MR. Mild-mod TR. PASP 43 mmHg. Mod non-mobile plaque seen in the visualized portion of the descending aorta + aortic arch. HF consulted for GDMT guidance. Structural consulted for new mod-severe MR.....     78-year-old male, PMHx HTN, HLD, who was sent to [facility] ED in AFib with RVR. Patient reports that he has been experiencing intermittent palpitations and dyspnea on significant exertion over the last one week, prompting him to report to urgent care today. He was found to be in Afib with RVR to 130s bpm at urgent care and was sent to [facility] ED for further evaluation. Patient reports that he follows with cardiologist Dr. Eden and underwent stress test/ECHO approximately six months ago, both of which were reportedly unremarkable. He does not recall history of a cardiac arrhythmia in the past. EKG 1/24/24 revealed aflutter @76 bpm, CXR 1/24/24 revealed cardiomegaly, otherwise no apparent congestion. Patient was given Cardizem 20 mg IVPx1, Cardizem 60 mg POx1, IV NS 1L bolus in ED and admitted to cardiac tele for further management of afib w RVR. Pt was rate controlled with Lopressor 25mg BID and initiated on Eliquis 5mg BID for AC. Pt reports of a remote hx of adverse reaction to general and local anesthesia (unsure what medications causes this) for which anesthesiology was consulted w no contraindications to undergo MAC for NAN/DCCV. Pt is now s/p NAN/DCCV on 1/25/24 and subsequent ablation on 1/23/24.      TTE 1/25/24: Mildly reduced LVSF EF 40-45%. Normal RV size/fctn. Severely dilated RA. Dilated RA. Mod eccentric MR. PAST 42 mmHg. NAN 1/25/24: Mildly reduced LVSF. ELIJAH. No LA/RA/JOSE/RAA thrombus seen. Mod-severe eccentric posteriorly directed MR. Mild-mod TR. PASP 43 mmHg. Mod non-mobile plaque seen in the visualized portion of the descending aorta + aortic arch.     Patient was initially scheduled for afib ablation procedure on 1/26/24, however, at this time he declines it and prefers to go home today. He prefers to have a discussion with his outpatient cardiologist before deciding anything further. Discussed case with EP and was advised verbal recs to change Lopressor to Toprol 25mg qd and continue NOAC. He can follow up outpatient with EP in 2-3 weeks.    Given his MR on echo, Structural was also called and was advised verbally, patient can follow-up outpatient as he is clinically stable and eager to be discharged. He will follow up with Dr. Magallanes in next 2-3 weeks.    Given his low EF and valvular disease including MR, HF was also called and advised ______________    Pt seen and examined at bedside this AM without any complaints or events overnight, VSS, labs and telemetry reviewed and pt stable for discharge as discussed with Dr. Gary. Pt has received appropriate discharge instructions, including medication regimen, access site management and follow up with  in 1-2 weeks.               78-year-old male, PMHx HTN, HLD, who was sent to [facility] ED in AFib with RVR. Patient reports that he has been experiencing intermittent palpitations and dyspnea on significant exertion over the last one week, prompting him to report to urgent care today. He was found to be in Afib with RVR to 130s bpm at urgent care and was sent to [facility] ED for further evaluation. Patient reports that he follows with cardiologist Dr. Eden and underwent stress test/ECHO approximately six months ago, both of which were reportedly unremarkable. He does not recall history of a cardiac arrhythmia in the past. EKG 1/24/24 revealed aflutter @76 bpm, CXR 1/24/24 revealed cardiomegaly, otherwise no apparent congestion. Patient was given Cardizem 20 mg IVPx1, Cardizem 60 mg POx1, IV NS 1L bolus in ED and admitted to cardiac tele for further management of afib w RVR. Pt was rate controlled with Lopressor 25mg BID and initiated on Eliquis 5mg BID for AC. Pt reports of a remote hx of adverse reaction to general and local anesthesia (unsure what medications causes this) for which anesthesiology was consulted w no contraindications to undergo MAC for NAN/DCCV. Pt is now s/p NAN/DCCV on 1/25/24 and subsequent ablation on 1/23/24.      TTE 1/25/24: Mildly reduced LVSF EF 40-45%. Normal RV size/fctn. Severely dilated RA. Dilated RA. Mod eccentric MR. PAST 42 mmHg. NAN 1/25/24: Mildly reduced LVSF. ELIJAH. No LA/RA/JOSE/RAA thrombus seen. Mod-severe eccentric posteriorly directed MR. Mild-mod TR. PASP 43 mmHg. Mod non-mobile plaque seen in the visualized portion of the descending aorta + aortic arch.     Patient was initially scheduled for afib ablation procedure on 1/26/24, however, at this time he declines it and prefers to go home today. He prefers to have a discussion with his outpatient cardiologist before deciding anything further. Discussed case with EP and was advised verbal recs to change Lopressor to Toprol 25mg qd and continue NOAC. He can follow up outpatient with EP in 2-3 weeks.    Given his MR on echo, Structural was also called and was advised verbally, patient can follow-up outpatient as he is clinically stable and eager to be discharged. He will follow up with Dr. Magallanes in next 2-3 weeks.    Given his low EF and valvular disease including MR, HF was also called and advised changing medications to Entresto 24/26mg BID, Toprol 50mg qd, and Farxiga 10mg qd. Patient will receive one month free supply. Per Adv HF verbal recs, pt can follow up with his cardiologist outpatient for continued management.     Pt seen and examined at bedside this AM without any complaints or events overnight, VSS, labs and telemetry reviewed and pt stable for discharge as discussed with Dr. Gary. Pt has received appropriate discharge instructions, including medication regimen, access site management and follow up with Dr. Eden in 1-2 weeks. He can follow up outpatient with EP in 2-3 weeks. He will follow up with Dr. Magallanes in next 2-3 weeks.                     78-year-old male, PMHx HTN, HLD, who was sent to [facility] ED in AFib with RVR. Patient reports that he has been experiencing intermittent palpitations and dyspnea on significant exertion over the last one week, prompting him to report to urgent care today. He was found to be in Afib with RVR to 130s bpm at urgent care and was sent to [facility] ED for further evaluation. Patient reports that he follows with cardiologist Dr. Eden and underwent stress test/ECHO approximately six months ago, both of which were reportedly unremarkable. He does not recall history of a cardiac arrhythmia in the past. EKG 1/24/24 revealed aflutter @76 bpm, CXR 1/24/24 revealed cardiomegaly, otherwise no apparent congestion. Patient was given Cardizem 20 mg IVPx1, Cardizem 60 mg POx1, IV NS 1L bolus in ED and admitted to cardiac tele for further management of afib w RVR. Pt was rate controlled with Lopressor 25mg BID and initiated on Eliquis 5mg BID for AC. Pt reports of a remote hx of adverse reaction to general and local anesthesia (unsure what medications causes this) for which anesthesiology was consulted w no contraindications to undergo MAC for NAN/DCCV. Pt is now s/p NAN/DCCV on 1/25/24 and subsequent ablation on 1/23/24.      TTE 1/25/24: Mildly reduced LVSF EF 40-45%. Normal RV size/fctn. Severely dilated RA. Dilated RA. Mod eccentric MR. PAST 42 mmHg. NAN 1/25/24: Mildly reduced LVSF. ELIJAH. No LA/RA/JOSE/RAA thrombus seen. Mod-severe eccentric posteriorly directed MR. Mild-mod TR. PASP 43 mmHg. Mod non-mobile plaque seen in the visualized portion of the descending aorta + aortic arch.     Patient was initially scheduled for afib ablation procedure on 1/26/24, however, at this time he declines it and prefers to go home today. He prefers to have a discussion with his outpatient cardiologist before deciding anything further. Discussed case with EP and was advised verbal recs to change Lopressor to Toprol 25mg qd and continue NOAC. He can follow up outpatient with EP in 2-3 weeks.    Given his MR on echo, Structural was also called and was advised verbally, patient can follow-up outpatient as he is clinically stable and eager to be discharged. He will follow up with Dr. Magallanes in next 2-3 weeks.    Given his low EF and valvular disease including MR, HF was also called and advised changing medications to Entresto 24/26mg BID, Toprol 50mg qd, and Farxiga 10mg qd. Patient will receive one month free supply. Per Adv HF verbal recs, pt can follow up with his cardiologist outpatient for continued management.     Patient is aware of the high copay due to Deductible for Eliquis, Farxiga, and Entresto. They will try the one month trial and will discuss case further with outpatient cardiologist.     Pt seen and examined at bedside this AM without any complaints or events overnight, VSS, labs and telemetry reviewed and pt stable for discharge as discussed with Dr. Gary. Pt has received appropriate discharge instructions, including medication regimen, access site management and follow up with Dr. Eden in 1-2 weeks. He can follow up outpatient with EP in 2-3 weeks. He will follow up with Dr. Magallanes in next 2-3 weeks.

## 2024-01-25 NOTE — PROGRESS NOTE ADULT - PROBLEM SELECTOR PLAN 1
P/w new-onset Afib w/ RVR to 130s.   - [id number]: 3   - EKG 1/24/24: AFlutter @ 76 bpm, no apparent ischemic changes  - TTE 1/25/24: Mildly reduced LVSF EF 40-45%. Normal RV size/fctn. Severely dilated RA. Dilated RA. Mod eccentric MR. PAST 42 mmHg.   - NAN 1/25/24: Mildly reduced LVSF. ELIJAH. No LA/RA/JOSE/RAA thrombus seen. Mod-severe eccentric posteriorly directed MR. Mild-mod TR. PASP 43 mmHg. Mod non-mobile plaque seen in the visualized portion of the descending aorta + aortic arch  - Plan for DCCV w EP on 1/25  - Rate-control: Lopressor 25 mg BID  - Anticoagulation: Eliquis 5 mg BID P/w new-onset Afib w/ RVR to 130s. [id number]: 3   - EKG 1/24/24: AFlutter @ 76 bpm, no apparent ischemic changes  - TTE 1/25/24: Mildly reduced LVSF EF 40-45%. Normal RV size/fctn. Severely dilated RA. Dilated RA. Mod eccentric MR. PAST 42 mmHg.   - NAN 1/25/24: Mildly reduced LVSF. ELIJAH. No LA/RA/JOSE/RAA thrombus seen. Mod-severe eccentric posteriorly directed MR. Mild-mod TR. PASP 43 mmHg. Mod non-mobile plaque seen in the visualized portion of the descending aorta + aortic arch  - S/p successful DCCV on 1/25 w EP  - Rate-control: Lopressor 25 mg BID  - AC: Eliquis 5 mg BID P/w new-onset Afib w/ RVR to 130s. [id number]: 3   - EKG 1/24/24: AFlutter @ 76 bpm, no apparent ischemic changes  - TTE 1/25/24: Mildly reduced LVSF EF 40-45%. Normal RV size/fctn. Severely dilated RA. Dilated RA. Mod eccentric MR. PAST 42 mmHg.   - NAN 1/25/24: Mildly reduced LVSF. ELIJAH. No LA/RA/JOSE/RAA thrombus seen. Mod-severe eccentric posteriorly directed MR. Mild-mod TR. PASP 43 mmHg. Mod non-mobile plaque seen in the visualized portion of the descending aorta + aortic arch  - S/p successful DCCV on 1/25 w EP  - Rate-control: Lopressor 25 mg BID  - AC: Eliquis 5 mg BID  - Plan for ablation 1/26/24

## 2024-01-25 NOTE — PROGRESS NOTE ADULT - SUBJECTIVE AND OBJECTIVE BOX
Cardiology PA Adult Progress Note    SUBJECTIVE ASSESSMENT:  	  MEDICATIONS:  losartan 50 milliGRAM(s) Oral daily  metoprolol tartrate 25 milliGRAM(s) Oral every 12 hours            atorvastatin 20 milliGRAM(s) Oral at bedtime    apixaban 5 milliGRAM(s) Oral every 12 hours  influenza  Vaccine (HIGH DOSE) 0.7 milliLiter(s) IntraMuscular once    	  VITAL SIGNS:  T(C): 36.8 (01-25-24 @ 05:51), Max: 36.9 (01-24-24 @ 20:31)  HR: 105 (01-25-24 @ 08:29) (76 - 105)  BP: 119/76 (01-25-24 @ 08:29) (114/66 - 134/68)  RR: 18 (01-25-24 @ 08:29) (16 - 18)  SpO2: 97% (01-25-24 @ 08:29) (93% - 97%)  Wt(kg): --    I&O's Summary    Height (cm): 160 (01-24 @ 17:45)  Weight (kg): 68 (01-24 @ 16:32)  BMI (kg/m2): 26.6 (01-24 @ 17:45)  BSA (m2): 1.71 (01-24 @ 17:45)                                     PHYSICAL EXAM:  Appearance: Normal, sitting in chair  HEENT: Normal oral mucosa, EOMI	  Neck: Supple, - JVD   Cardiovascular: irregularly irregular  Respiratory: Lungs clear to auscultation. No Rales, Rhonchi, Wheezing	  Gastrointestinal:  Soft, Non-tender, + BS	  Skin: No rashes, No ecchymoses, No cyanosis  Extremities: Normal range of motion, No clubbing, cyanosis or edema  Vascular: Peripheral pulses palpable 2+ bilaterally  Neurologic: Non-focal  Psychiatry: A & O x 3, Mood & affect appropriate    LABS:	 	                          14.5   6.11  )-----------( 224      ( 25 Jan 2024 11:26 )             45.7     01-25    144  |  109<H>  |  15  ----------------------------<  100<H>  4.9   |  28  |  1.08    Ca    9.5      25 Jan 2024 11:26  Mg     2.2     01-25    TPro  6.7  /  Alb  4.2  /  TBili  0.6  /  DBili  x   /  AST  25  /  ALT  21  /  AlkPhos  93  01-25    TSH: Thyroid Stimulating Hormone, Serum: 3.410 uIU/mL (01-25 @ 11:26)    PT/INR - ( 25 Jan 2024 11:26 )   PT: 13.9 sec;   INR: 1.23        PTT - ( 25 Jan 2024 11:26 )  PTT:36.9 sec Cardiology PA Adult Progress Note    SUBJECTIVE ASSESSMENT:  	  MEDICATIONS:  losartan 50 milliGRAM(s) Oral daily  metoprolol tartrate 25 milliGRAM(s) Oral every 12 hours    atorvastatin 20 milliGRAM(s) Oral at bedtime    apixaban 5 milliGRAM(s) Oral every 12 hours  influenza  Vaccine (HIGH DOSE) 0.7 milliLiter(s) IntraMuscular once    	  VITAL SIGNS:  T(C): 36.8 (01-25-24 @ 05:51), Max: 36.9 (01-24-24 @ 20:31)  HR: 105 (01-25-24 @ 08:29) (76 - 105)  BP: 119/76 (01-25-24 @ 08:29) (114/66 - 134/68)  RR: 18 (01-25-24 @ 08:29) (16 - 18)  SpO2: 97% (01-25-24 @ 08:29) (93% - 97%)  Wt(kg): --    I&O's Summary    Height (cm): 160 (01-24 @ 17:45)  Weight (kg): 68 (01-24 @ 16:32)  BMI (kg/m2): 26.6 (01-24 @ 17:45)  BSA (m2): 1.71 (01-24 @ 17:45)                                     PHYSICAL EXAM:  Appearance: Normal, sitting in chair  HEENT: Normal oral mucosa, EOMI	  Neck: Supple, - JVD   Cardiovascular: irregularly irregular  Respiratory: Lungs clear to auscultation. No Rales, Rhonchi, Wheezing	  Gastrointestinal:  Soft, Non-tender, + BS	  Skin: No rashes, No ecchymoses, No cyanosis  Extremities: Normal range of motion, No clubbing, cyanosis or edema  Vascular: Peripheral pulses palpable 2+ bilaterally  Neurologic: Non-focal  Psychiatry: A & O x 3, Mood & affect appropriate    LABS:	 	                          14.5   6.11  )-----------( 224      ( 25 Jan 2024 11:26 )             45.7     01-25    144  |  109<H>  |  15  ----------------------------<  100<H>  4.9   |  28  |  1.08    Ca    9.5      25 Jan 2024 11:26  Mg     2.2     01-25    TPro  6.7  /  Alb  4.2  /  TBili  0.6  /  DBili  x   /  AST  25  /  ALT  21  /  AlkPhos  93  01-25    TSH: Thyroid Stimulating Hormone, Serum: 3.410 uIU/mL (01-25 @ 11:26)    PT/INR - ( 25 Jan 2024 11:26 )   PT: 13.9 sec;   INR: 1.23        PTT - ( 25 Jan 2024 11:26 )  PTT:36.9 sec Cardiology PA Adult Progress Note    SUBJECTIVE ASSESSMENT: Met with and examined the pt at bedside this morning seen sitting comfortably in his chair. He states he occasionally experiences some palpitations but otherwise denies any CP, SOB, dizziness/lightheadedness, n/v, LE edema.   	  MEDICATIONS:  losartan 50 milliGRAM(s) Oral daily  metoprolol tartrate 25 milliGRAM(s) Oral every 12 hours    atorvastatin 20 milliGRAM(s) Oral at bedtime    apixaban 5 milliGRAM(s) Oral every 12 hours  influenza  Vaccine (HIGH DOSE) 0.7 milliLiter(s) IntraMuscular once    	  VITAL SIGNS:  T(C): 36.8 (01-25-24 @ 05:51), Max: 36.9 (01-24-24 @ 20:31)  HR: 105 (01-25-24 @ 08:29) (76 - 105)  BP: 119/76 (01-25-24 @ 08:29) (114/66 - 134/68)  RR: 18 (01-25-24 @ 08:29) (16 - 18)  SpO2: 97% (01-25-24 @ 08:29) (93% - 97%)  Wt(kg): --    I&O's Summary    Height (cm): 160 (01-24 @ 17:45)  Weight (kg): 68 (01-24 @ 16:32)  BMI (kg/m2): 26.6 (01-24 @ 17:45)  BSA (m2): 1.71 (01-24 @ 17:45)                                     PHYSICAL EXAM:  Appearance: Normal, sitting in chair  HEENT: Normal oral mucosa, EOMI	  Neck: Supple, - JVD   Cardiovascular: irregularly irregular  Respiratory: Lungs clear to auscultation. No Rales, Rhonchi, Wheezing	  Gastrointestinal:  Soft, Non-tender, + BS	  Skin: No rashes, No ecchymoses, No cyanosis  Extremities: Normal range of motion, No clubbing, cyanosis or edema  Vascular: Peripheral pulses palpable 2+ bilaterally  Neurologic: Non-focal  Psychiatry: A & O x 3, Mood & affect appropriate    LABS:	 	                          14.5   6.11  )-----------( 224      ( 25 Jan 2024 11:26 )             45.7     01-25    144  |  109<H>  |  15  ----------------------------<  100<H>  4.9   |  28  |  1.08    Ca    9.5      25 Jan 2024 11:26  Mg     2.2     01-25    TPro  6.7  /  Alb  4.2  /  TBili  0.6  /  DBili  x   /  AST  25  /  ALT  21  /  AlkPhos  93  01-25    TSH: Thyroid Stimulating Hormone, Serum: 3.410 uIU/mL (01-25 @ 11:26)    PT/INR - ( 25 Jan 2024 11:26 )   PT: 13.9 sec;   INR: 1.23        PTT - ( 25 Jan 2024 11:26 )  PTT:36.9 sec

## 2024-01-25 NOTE — DIETITIAN INITIAL EVALUATION ADULT - ADD RECOMMEND
Statement Selected
1. Recommend DASH diet  2. Encourage pt to meet nutritional needs as able   3. Monitor labs: electrolytes, cmp, serum lipids  4. Monitor weights   5. Pain and bowel regimen per team   6. Will continue to assess/honor food preferences as able  7. Monitor adherence to diet education

## 2024-01-25 NOTE — DIETITIAN INITIAL EVALUATION ADULT - NAME AND PHONE
RD contact details :   Provided to the patient for reference RD contact details :   Provided to the patient for reference (name, email, phone number)

## 2024-01-25 NOTE — DISCHARGE NOTE PROVIDER - ATTENDING DISCHARGE PHYSICAL EXAMINATION:
78-year-old male, PMHx HTN, HLD, admitted for cardiac telemetry for treatment of arrhythmia.    1. Atrial flutter variable block  s/p NAN DCCV with restoration of sinus rhythm.  Continue eliquis 5 mg bid.  Declined RFA.    2. HFrEF, acute 40-45%  Newly diagnosed HFrEF, possibly tachy-mediated but may have a component of MR. Suggest ischemic evaluation, patient declined, wants to follow up with primary cardiologist at [facility], on toprol and losartan.    3. Severe MR  Moderate to severe MR on NAN, appears mixed etiology with some component of degenerative MR and atrial dilation (atrial MR).   We intended to start GDMT and consult SHD, but patient wants to follow up with [facility] cardiologist, very reasonable.

## 2024-01-25 NOTE — PROGRESS NOTE ADULT - ASSESSMENT
INCOMPLETE     or segment, apical inferior segment, and apex are abnormal. No LV thormbus.  77 yo M with PMHx of HTN and HLD admitted to cardiac tele for new onset afib w RVR. Anesthesiology consulted for unclear remote hx of allergy, cleared to undergo MAC for DCCV; now s/p NAN/DCCV on 1/25/24 with EP. TTE with new findings of mod MR.  79 yo M with PMHx of HTN and HLD admitted to cardiac tele for new onset afib w RVR. Anesthesiology consulted for unclear remote hx of allergy, cleared to undergo MAC for DCCV; now s/p successful NAN/DCCV on 1/25/24 with EP. TTE with new findings of mod MR.  77 yo M with PMHx of HTN and HLD admitted to cardiac tele for new onset afib w RVR. Anesthesiology consulted for unclear remote hx of anestesia allergy, cleared to undergo MAC for DCCV. Now s/p successful NAN/DCCV on 1/25/24 with EP. Plan to undergo ablation on 1/26/24.

## 2024-01-25 NOTE — DISCHARGE NOTE PROVIDER - PROVIDER TOKENS
PROVIDER:[TOKEN:[[id number]],FOLLOWUP:[2 weeks]] PROVIDER:[TOKEN:[[id number]],FOLLOWUP:[2 weeks]],PROVIDER:[TOKEN:[[id number]],FOLLOWUP:[2 weeks]] PROVIDER:[TOKEN:[[id number]],FOLLOWUP:[2 weeks]],PROVIDER:[TOKEN:[[id number]],FOLLOWUP:[2 weeks]],PROVIDER:[TOKEN:[[id number]],FOLLOWUP:[2 weeks]]

## 2024-01-25 NOTE — DIETITIAN INITIAL EVALUATION ADULT - PROBLEM SELECTOR PLAN 1
Patient p/w new-onset Afib w/ RVR to 130s, currently rate-controlled to 80s, asymptomatic   - [id number]: 3   - EKG (1/24/24): AFlutter @ 76 bpm, no apparent ischemic changes  - CXR (1/24/24, prelim): cardiomegaly; no apparent congestion   - Rate-control: Lopressor 25 mg BID  - Rhythm-control: pending anesthesia clearance for sedation for NAN/DCCV  - Anticoagulation: Eliquis 5 mg BID  - Follow up cardiac risk labs   - EP consulted, f/u recs   - Continue: telemetry monitoring, vital signs

## 2024-01-25 NOTE — DISCHARGE NOTE PROVIDER - CARE PROVIDERS DIRECT ADDRESSES
,[email or web address]

## 2024-01-25 NOTE — DIETITIAN INITIAL EVALUATION ADULT - PERTINENT LABORATORY DATA
01-25    144  |  109<H>  |  15  ----------------------------<  100<H>  4.9   |  28  |  1.08    Ca    9.5      25 Jan 2024 11:26  Mg     2.2     01-25    TPro  6.7  /  Alb  4.2  /  TBili  0.6  /  DBili  x   /  AST  25  /  ALT  21  /  AlkPhos  93  01-25

## 2024-01-26 ENCOUNTER — TRANSCRIPTION ENCOUNTER (OUTPATIENT)
Age: 79
End: 2024-01-26

## 2024-01-26 VITALS — DIASTOLIC BLOOD PRESSURE: 65 MMHG | SYSTOLIC BLOOD PRESSURE: 112 MMHG

## 2024-01-26 PROCEDURE — 82553 CREATINE MB FRACTION: CPT

## 2024-01-26 PROCEDURE — 99223 1ST HOSP IP/OBS HIGH 75: CPT | Mod: GC

## 2024-01-26 PROCEDURE — 80053 COMPREHEN METABOLIC PANEL: CPT

## 2024-01-26 PROCEDURE — 80061 LIPID PANEL: CPT

## 2024-01-26 PROCEDURE — 96374 THER/PROPH/DIAG INJ IV PUSH: CPT

## 2024-01-26 PROCEDURE — 83735 ASSAY OF MAGNESIUM: CPT

## 2024-01-26 PROCEDURE — 85025 COMPLETE CBC W/AUTO DIFF WBC: CPT

## 2024-01-26 PROCEDURE — 99285 EMERGENCY DEPT VISIT HI MDM: CPT | Mod: 25

## 2024-01-26 PROCEDURE — 82550 ASSAY OF CK (CPK): CPT

## 2024-01-26 PROCEDURE — 84484 ASSAY OF TROPONIN QUANT: CPT

## 2024-01-26 PROCEDURE — 36415 COLL VENOUS BLD VENIPUNCTURE: CPT

## 2024-01-26 PROCEDURE — 93005 ELECTROCARDIOGRAM TRACING: CPT

## 2024-01-26 PROCEDURE — 71045 X-RAY EXAM CHEST 1 VIEW: CPT

## 2024-01-26 PROCEDURE — 85730 THROMBOPLASTIN TIME PARTIAL: CPT

## 2024-01-26 PROCEDURE — 85610 PROTHROMBIN TIME: CPT

## 2024-01-26 PROCEDURE — 86803 HEPATITIS C AB TEST: CPT

## 2024-01-26 PROCEDURE — 83880 ASSAY OF NATRIURETIC PEPTIDE: CPT

## 2024-01-26 PROCEDURE — 99239 HOSP IP/OBS DSCHRG MGMT >30: CPT

## 2024-01-26 PROCEDURE — 93306 TTE W/DOPPLER COMPLETE: CPT

## 2024-01-26 PROCEDURE — C8925: CPT

## 2024-01-26 PROCEDURE — 84443 ASSAY THYROID STIM HORMONE: CPT

## 2024-01-26 RX ORDER — SACUBITRIL AND VALSARTAN 24; 26 MG/1; MG/1
1 TABLET, FILM COATED ORAL
Qty: 60 | Refills: 2
Start: 2024-01-26 | End: 2024-04-24

## 2024-01-26 RX ORDER — DAPAGLIFLOZIN 10 MG/1
1 TABLET, FILM COATED ORAL
Qty: 30 | Refills: 2
Start: 2024-01-26 | End: 2024-04-24

## 2024-01-26 RX ORDER — METOPROLOL TARTRATE 50 MG
1 TABLET ORAL
Qty: 30 | Refills: 2
Start: 2024-01-26 | End: 2024-04-24

## 2024-01-26 RX ORDER — APIXABAN 2.5 MG/1
1 TABLET, FILM COATED ORAL
Qty: 60 | Refills: 2
Start: 2024-01-26 | End: 2024-04-24

## 2024-01-26 RX ORDER — DAPAGLIFLOZIN 10 MG/1
1 TABLET, FILM COATED ORAL
Qty: 30 | Refills: 0
Start: 2024-01-26 | End: 2024-02-24

## 2024-01-26 RX ORDER — SACUBITRIL AND VALSARTAN 24; 26 MG/1; MG/1
1 TABLET, FILM COATED ORAL
Qty: 60 | Refills: 0
Start: 2024-01-26 | End: 2024-02-24

## 2024-01-26 RX ORDER — LOSARTAN POTASSIUM 100 MG/1
1 TABLET, FILM COATED ORAL
Refills: 0 | DISCHARGE

## 2024-01-26 RX ADMIN — Medication 25 MILLIGRAM(S): at 06:47

## 2024-01-26 RX ADMIN — LOSARTAN POTASSIUM 50 MILLIGRAM(S): 100 TABLET, FILM COATED ORAL at 06:47

## 2024-01-26 NOTE — CONSULT NOTE ADULT - ATTENDING COMMENTS
77 YO M with a history of HTN who presented with symptomatic rapid AF and now s/p DCCV with return to sinus rhythm. HF consulted for mild LV dysfunction on TTE (40-45%).    Suspect mild LV dysfunction related to rapid atrial fibrillation. If LVEF remains reduced as outpatient could consider ischemic evaluation.    For GDMT, switch losartan 50 mg daily to entresto 24-26 mg BID, switch metoprolol tartrate 25 mg BID to metoprolol succinate 50 mg daily, and start Farxiga 10 mg daily for HF with midrange LVEF.    He is euvolemic off diuretics.    He will follow with his [facility] cardiologist on discharge.

## 2024-01-26 NOTE — DISCHARGE NOTE NURSING/CASE MANAGEMENT/SOCIAL WORK - NSDCFUADDAPPT_GEN_ALL_CORE_FT
Please follow up with EP Dr. Valentine in 2 weeks after discharge.     Please follow up with your cardiologist Dr. Eden in 1-2 weeks after discharge.     Please

## 2024-01-26 NOTE — CONSULT NOTE ADULT - SUBJECTIVE AND OBJECTIVE BOX
HPI/HOSPITAL COURSE:  HPI:  78-year-old male, PMHx HTN, HLD, who was sent to [facility] ED in AFib with RVR. Patient reports that he has been experiencing intermittent palpitations and dyspnea on significant exertion over the last one week, prompting him to report to urgent care today. He was found to be in Afib with RVR to 130s bpm at urgent care and was sent to [facility] ED for further evaluation. Patient denies CP, SOB, dizziness, orthopnea/PND, leg swelling, LOC, bleeding, melena/hematochezia, fever, chills, URI symptoms, or recent illness. Patient reports that he follows with cardiologist Dr. Eden and underwent stress test/ECHO approximately six months ago, both of which were reportedly unremarkable. On arrival patient rate controlled on Lopressor 25 BID. S/P TTE/NAN w/ EF 40-45% and DCCV.         INTERVAL EVENTS:    SUBJECTIVE HPI: Patient seen and examined at bedside. Patient resting comfortably, no complaints at this time. Patient denies: fever, chills, weakness, dizziness, headaches, changes in vision, chest pain, palpitations, shortness of breath, cough, N/V, diarrhea or constipation, dysuria, LE edema. ROS otherwise negative.      PAST MEDICAL & SURGICAL HISTORY:  HTN (hypertension)      HLD (hyperlipidemia)      H/O foot surgery      H/O tooth extraction              Home Medications:  atorvastatin 20 mg oral tablet: 1 tab(s) orally once a day (at bedtime) (24 Jan 2024 17:45)  losartan 50 mg oral tablet: 1 tab(s) orally once a day (24 Jan 2024 17:45)      MEDICATIONS  (STANDING):  apixaban 5 milliGRAM(s) Oral every 12 hours  atorvastatin 20 milliGRAM(s) Oral at bedtime  influenza  Vaccine (HIGH DOSE) 0.7 milliLiter(s) IntraMuscular once  losartan 50 milliGRAM(s) Oral daily  metoprolol tartrate 25 milliGRAM(s) Oral every 12 hours    MEDICATIONS  (PRN):      VITAL SIGNS:  Vital Signs Last 24 Hrs  T(C): 36.6 (26 Jan 2024 06:23), Max: 36.7 (25 Jan 2024 16:35)  T(F): 97.8 (26 Jan 2024 06:23), Max: 98 (25 Jan 2024 16:35)  HR: 70 (26 Jan 2024 06:23) (66 - 75)  BP: 112/65 (26 Jan 2024 09:19) (98/60 - 119/72)  BP(mean): 91 (26 Jan 2024 06:23) (73 - 91)  RR: 16 (26 Jan 2024 06:23) (16 - 18)  SpO2: 97% (26 Jan 2024 06:23) (94% - 98%)    Parameters below as of 26 Jan 2024 09:19  Patient On (Oxygen Delivery Method): pt refused        I&O's Detail    25 Jan 2024 07:01  -  26 Jan 2024 07:00  --------------------------------------------------------  IN:    Oral Fluid: 180 mL  Total IN: 180 mL    OUT:  Total OUT: 0 mL    Total NET: 180 mL          PHYSICAL EXAM:  General: Comfortable  Neurological: AAOx3, no focal deficits  HEENT: NC/AT; EOMI, PERRL, clear conjunctiva, no nasal or oropharyngeal discharge or exudates, MMM  Neck: supple, no cervical or post-auricular lymphadenopathy  Cardiovascular: +S1/S2, no murmurs/rubs/gallops, RRR  Respiratory: CTA B/L, no diminished breath sounds, no wheezes, no increased work of breathing or accessory muscle use  Gastrointestinal: soft, NT/ND; active BSx4 quadrants  Genitourinary: no suprapubic tenderness, no CVA tenderness  Extremities: WWP; no edema, clubbing or cyanosis  Vascular: 2+ radial, DP/PT pulses B/L  Skin: no rashes  Lines/Drains:     LABS:                        14.5   6.11  )-----------( 224      ( 25 Jan 2024 11:26 )             45.7     01-25    144  |  109<H>  |  15  ----------------------------<  100<H>  4.9   |  28  |  1.08    Ca    9.5      25 Jan 2024 11:26  Mg     2.2     01-25    TPro  6.7  /  Alb  4.2  /  TBili  0.6  /  DBili  x   /  AST  25  /  ALT  21  /  AlkPhos  93  01-25    PT/INR - ( 25 Jan 2024 11:26 )   PT: 13.9 sec;   INR: 1.23          PTT - ( 25 Jan 2024 11:26 )  PTT:36.9 sec    CARDIAC MARKERS ( 24 Jan 2024 23:15 )  x     / x     / 119 U/L / x     / 4.5 ng/mL      BNP    Urinalysis Basic - ( 25 Jan 2024 11:26 )    Color: x / Appearance: x / SG: x / pH: x  Gluc: 100 mg/dL / Ketone: x  / Bili: x / Urobili: x   Blood: x / Protein: x / Nitrite: x   Leuk Esterase: x / RBC: x / WBC x   Sq Epi: x / Non Sq Epi: x / Bacteria: x            Microbiology:        RADIOLOGY & ADDITIONAL STUDIES: Reviewed.

## 2024-01-26 NOTE — DISCHARGE NOTE NURSING/CASE MANAGEMENT/SOCIAL WORK - PATIENT PORTAL LINK FT
You can access the FollowMyHealth Patient Portal offered by [facility] by registering at the following website: [email or web address]. By joining [employer]’s FollowMyHealth portal, you will also be able to view your health information using other applications (apps) compatible with our system.

## 2024-01-26 NOTE — CONSULT NOTE ADULT - ASSESSMENT
78-year-old male, PMHx HTN, HLD, admitted for cardiac telemetry for treatment of Afib w/ RVR s/p DCCV, currently rate controlled in NSR.     # HFmrEF  Patient presenting with Afib RVR. TTE significant for LVEF 40-45% with global hypokinesis. Mod-severe MR.   - Switch home Losartan to entresto 24/26 BID  - Start Toprol 50mg qd  - Start Farxiga 10mg qd  - Repeat TTE in 3 month for interval change

## 2024-01-28 ENCOUNTER — INPATIENT (INPATIENT)
Facility: HOSPITAL | Age: 79
LOS: 1 days | Discharge: HOME | DRG: 274 | End: 2024-01-30
Attending: HOSPITALIST | Admitting: INTERNAL MEDICINE
Payer: MEDICARE

## 2024-01-28 VITALS
TEMPERATURE: 98 F | RESPIRATION RATE: 18 BRPM | SYSTOLIC BLOOD PRESSURE: 134 MMHG | OXYGEN SATURATION: 100 % | DIASTOLIC BLOOD PRESSURE: 79 MMHG | WEIGHT: 153 LBS | HEIGHT: 63 IN | HEART RATE: 231 BPM

## 2024-01-28 DIAGNOSIS — R00.0 TACHYCARDIA, UNSPECIFIED: ICD-10-CM

## 2024-01-28 DIAGNOSIS — I48.91 UNSPECIFIED ATRIAL FIBRILLATION: ICD-10-CM

## 2024-01-28 DIAGNOSIS — I50.30 UNSPECIFIED DIASTOLIC (CONGESTIVE) HEART FAILURE: ICD-10-CM

## 2024-01-28 DIAGNOSIS — E78.5 HYPERLIPIDEMIA, UNSPECIFIED: ICD-10-CM

## 2024-01-28 DIAGNOSIS — Z98.890 OTHER SPECIFIED POSTPROCEDURAL STATES: Chronic | ICD-10-CM

## 2024-01-28 DIAGNOSIS — K08.409 PARTIAL LOSS OF TEETH, UNSPECIFIED CAUSE, UNSPECIFIED CLASS: Chronic | ICD-10-CM

## 2024-01-28 DIAGNOSIS — I34.0 NONRHEUMATIC MITRAL (VALVE) INSUFFICIENCY: ICD-10-CM

## 2024-01-28 PROBLEM — I10 ESSENTIAL (PRIMARY) HYPERTENSION: Chronic | Status: ACTIVE | Noted: 2024-01-24

## 2024-01-28 LAB
ALBUMIN SERPL ELPH-MCNC: 4.1 G/DL — SIGNIFICANT CHANGE UP (ref 3.3–5)
ALP SERPL-CCNC: 100 U/L — SIGNIFICANT CHANGE UP (ref 40–120)
ALT FLD-CCNC: 25 U/L — SIGNIFICANT CHANGE UP (ref 10–45)
ANION GAP SERPL CALC-SCNC: 13 MMOL/L — SIGNIFICANT CHANGE UP (ref 5–17)
APTT BLD: 36.5 SEC — HIGH (ref 24.5–35.6)
AST SERPL-CCNC: 26 U/L — SIGNIFICANT CHANGE UP (ref 10–40)
BASOPHILS # BLD AUTO: 0.07 K/UL — SIGNIFICANT CHANGE UP (ref 0–0.2)
BASOPHILS NFR BLD AUTO: 0.9 % — SIGNIFICANT CHANGE UP (ref 0–2)
BILIRUB SERPL-MCNC: 0.8 MG/DL — SIGNIFICANT CHANGE UP (ref 0.2–1.2)
BUN SERPL-MCNC: 21 MG/DL — SIGNIFICANT CHANGE UP (ref 7–23)
CALCIUM SERPL-MCNC: 9.5 MG/DL — SIGNIFICANT CHANGE UP (ref 8.4–10.5)
CHLORIDE SERPL-SCNC: 106 MMOL/L — SIGNIFICANT CHANGE UP (ref 96–108)
CO2 SERPL-SCNC: 24 MMOL/L — SIGNIFICANT CHANGE UP (ref 22–31)
CREAT SERPL-MCNC: 1.13 MG/DL — SIGNIFICANT CHANGE UP (ref 0.5–1.3)
EGFR: 67 ML/MIN/1.73M2 — SIGNIFICANT CHANGE UP
EOSINOPHIL # BLD AUTO: 0.29 K/UL — SIGNIFICANT CHANGE UP (ref 0–0.5)
EOSINOPHIL NFR BLD AUTO: 3.8 % — SIGNIFICANT CHANGE UP (ref 0–6)
GLUCOSE SERPL-MCNC: 100 MG/DL — HIGH (ref 70–99)
HCT VFR BLD CALC: 44.3 % — SIGNIFICANT CHANGE UP (ref 39–50)
HGB BLD-MCNC: 14.5 G/DL — SIGNIFICANT CHANGE UP (ref 13–17)
IMM GRANULOCYTES NFR BLD AUTO: 0.3 % — SIGNIFICANT CHANGE UP (ref 0–0.9)
INR BLD: 1.22 — HIGH (ref 0.85–1.18)
LYMPHOCYTES # BLD AUTO: 2.42 K/UL — SIGNIFICANT CHANGE UP (ref 1–3.3)
LYMPHOCYTES # BLD AUTO: 31.8 % — SIGNIFICANT CHANGE UP (ref 13–44)
MAGNESIUM SERPL-MCNC: 1.8 MG/DL — SIGNIFICANT CHANGE UP (ref 1.6–2.6)
MCHC RBC-ENTMCNC: 27.6 PG — SIGNIFICANT CHANGE UP (ref 27–34)
MCHC RBC-ENTMCNC: 32.7 GM/DL — SIGNIFICANT CHANGE UP (ref 32–36)
MCV RBC AUTO: 84.2 FL — SIGNIFICANT CHANGE UP (ref 80–100)
MONOCYTES # BLD AUTO: 0.79 K/UL — SIGNIFICANT CHANGE UP (ref 0–0.9)
MONOCYTES NFR BLD AUTO: 10.4 % — SIGNIFICANT CHANGE UP (ref 2–14)
NEUTROPHILS # BLD AUTO: 4.01 K/UL — SIGNIFICANT CHANGE UP (ref 1.8–7.4)
NEUTROPHILS NFR BLD AUTO: 52.8 % — SIGNIFICANT CHANGE UP (ref 43–77)
NRBC # BLD: 0 /100 WBCS — SIGNIFICANT CHANGE UP (ref 0–0)
NT-PROBNP SERPL-SCNC: 452 PG/ML — HIGH (ref 0–300)
PHOSPHATE SERPL-MCNC: 3.2 MG/DL — SIGNIFICANT CHANGE UP (ref 2.5–4.5)
PLATELET # BLD AUTO: 212 K/UL — SIGNIFICANT CHANGE UP (ref 150–400)
POTASSIUM SERPL-MCNC: 4 MMOL/L — SIGNIFICANT CHANGE UP (ref 3.5–5.3)
POTASSIUM SERPL-SCNC: 4 MMOL/L — SIGNIFICANT CHANGE UP (ref 3.5–5.3)
PROT SERPL-MCNC: 7.2 G/DL — SIGNIFICANT CHANGE UP (ref 6–8.3)
PROTHROM AB SERPL-ACNC: 13.8 SEC — HIGH (ref 9.5–13)
RBC # BLD: 5.26 M/UL — SIGNIFICANT CHANGE UP (ref 4.2–5.8)
RBC # FLD: 12.8 % — SIGNIFICANT CHANGE UP (ref 10.3–14.5)
SODIUM SERPL-SCNC: 143 MMOL/L — SIGNIFICANT CHANGE UP (ref 135–145)
TROPONIN T, HIGH SENSITIVITY RESULT: 22 NG/L — SIGNIFICANT CHANGE UP (ref 0–51)
WBC # BLD: 7.6 K/UL — SIGNIFICANT CHANGE UP (ref 3.8–10.5)
WBC # FLD AUTO: 7.6 K/UL — SIGNIFICANT CHANGE UP (ref 3.8–10.5)

## 2024-01-28 PROCEDURE — 71045 X-RAY EXAM CHEST 1 VIEW: CPT | Mod: 26

## 2024-01-28 PROCEDURE — 93010 ELECTROCARDIOGRAM REPORT: CPT

## 2024-01-28 PROCEDURE — 92960 CARDIOVERSION ELECTRIC EXT: CPT

## 2024-01-28 PROCEDURE — 99223 1ST HOSP IP/OBS HIGH 75: CPT

## 2024-01-28 PROCEDURE — 99285 EMERGENCY DEPT VISIT HI MDM: CPT | Mod: 25

## 2024-01-28 RX ORDER — ATORVASTATIN CALCIUM 80 MG/1
20 TABLET, FILM COATED ORAL AT BEDTIME
Refills: 0 | Status: DISCONTINUED | OUTPATIENT
Start: 2024-01-28 | End: 2024-01-30

## 2024-01-28 RX ORDER — INFLUENZA VIRUS VACCINE 15; 15; 15; 15 UG/.5ML; UG/.5ML; UG/.5ML; UG/.5ML
0.7 SUSPENSION INTRAMUSCULAR ONCE
Refills: 0 | Status: DISCONTINUED | OUTPATIENT
Start: 2024-01-28 | End: 2024-01-30

## 2024-01-28 RX ORDER — FENTANYL CITRATE 50 UG/ML
50 INJECTION INTRAVENOUS ONCE
Refills: 0 | Status: DISCONTINUED | OUTPATIENT
Start: 2024-01-28 | End: 2024-01-28

## 2024-01-28 RX ORDER — APIXABAN 2.5 MG/1
5 TABLET, FILM COATED ORAL EVERY 12 HOURS
Refills: 0 | Status: DISCONTINUED | OUTPATIENT
Start: 2024-01-28 | End: 2024-01-30

## 2024-01-28 RX ORDER — AMIODARONE HYDROCHLORIDE 400 MG/1
1 TABLET ORAL
Qty: 450 | Refills: 0 | Status: DISCONTINUED | OUTPATIENT
Start: 2024-01-28 | End: 2024-01-28

## 2024-01-28 RX ORDER — AMIODARONE HYDROCHLORIDE 400 MG/1
150 TABLET ORAL ONCE
Refills: 0 | Status: COMPLETED | OUTPATIENT
Start: 2024-01-28 | End: 2024-01-28

## 2024-01-28 RX ORDER — METOPROLOL TARTRATE 50 MG
5 TABLET ORAL ONCE
Refills: 0 | Status: COMPLETED | OUTPATIENT
Start: 2024-01-28 | End: 2024-01-28

## 2024-01-28 RX ORDER — AMIODARONE HYDROCHLORIDE 400 MG/1
0.5 TABLET ORAL
Qty: 450 | Refills: 0 | Status: DISCONTINUED | OUTPATIENT
Start: 2024-01-28 | End: 2024-01-28

## 2024-01-28 RX ORDER — AMIODARONE HYDROCHLORIDE 400 MG/1
0.5 TABLET ORAL
Qty: 450 | Refills: 0 | Status: DISCONTINUED | OUTPATIENT
Start: 2024-01-28 | End: 2024-01-29

## 2024-01-28 RX ORDER — MAGNESIUM SULFATE 500 MG/ML
1 VIAL (ML) INJECTION ONCE
Refills: 0 | Status: COMPLETED | OUTPATIENT
Start: 2024-01-28 | End: 2024-01-28

## 2024-01-28 RX ORDER — SACUBITRIL AND VALSARTAN 24; 26 MG/1; MG/1
1 TABLET, FILM COATED ORAL
Refills: 0 | Status: DISCONTINUED | OUTPATIENT
Start: 2024-01-29 | End: 2024-01-30

## 2024-01-28 RX ORDER — METOPROLOL TARTRATE 50 MG
25 TABLET ORAL ONCE
Refills: 0 | Status: COMPLETED | OUTPATIENT
Start: 2024-01-28 | End: 2024-01-28

## 2024-01-28 RX ADMIN — Medication 100 GRAM(S): at 11:55

## 2024-01-28 RX ADMIN — APIXABAN 5 MILLIGRAM(S): 2.5 TABLET, FILM COATED ORAL at 17:03

## 2024-01-28 RX ADMIN — AMIODARONE HYDROCHLORIDE 600 MILLIGRAM(S): 400 TABLET ORAL at 11:54

## 2024-01-28 RX ADMIN — Medication 5 MILLIGRAM(S): at 09:33

## 2024-01-28 RX ADMIN — FENTANYL CITRATE 50 MICROGRAM(S): 50 INJECTION INTRAVENOUS at 09:22

## 2024-01-28 RX ADMIN — ATORVASTATIN CALCIUM 20 MILLIGRAM(S): 80 TABLET, FILM COATED ORAL at 22:33

## 2024-01-28 RX ADMIN — AMIODARONE HYDROCHLORIDE 33.3 MG/MIN: 400 TABLET ORAL at 12:07

## 2024-01-28 RX ADMIN — AMIODARONE HYDROCHLORIDE 16.7 MG/MIN: 400 TABLET ORAL at 13:10

## 2024-01-28 RX ADMIN — Medication 25 MILLIGRAM(S): at 09:38

## 2024-01-28 NOTE — H&P ADULT - PROBLEM SELECTOR PLAN 2
Found to be in new onset Afib/Aflutter at [facility] last week.   -HOLD Toprol 2/2 sinus jennifer 40s  -c/w Amio gtt to 0.5mg/kg as above per EP  -c/w Eliquis 5mg BID (reports compliance)  -Plan for EPS/Aflutter ablation 1/29/24

## 2024-01-28 NOTE — ED PROCEDURE NOTE - PROCEDURE DATE TIME, MLM
42y , @36w2d, CYNTHIA 23 by "[employer] presenting for contractions. Reports painful contractions for the past 6 hours, now 3-5 minutes apart, 7.5/10 in intensity.  Denies lof, vb. + FM.  Reports that "baby is measuring big this pregnancy." Denies complications this pregnancy. GBS unknown.     H/o 3 Pre-term deliveries at 34wk, 35wks, and 36wks.   28-Jan-2024 09:25

## 2024-01-28 NOTE — H&P ADULT - NSHPOUTPATIENTPROVIDERS_GEN_ALL_CORE
cardiologist Dr Wali Eden  PMD Dr Zhao Reynoso cardiologist Dr Wali Eden [phone]  PMD Dr Zhao Reynoso

## 2024-01-28 NOTE — H&P ADULT - NSHPLABSRESULTS_GEN_ALL_CORE
14.5   7.60  )-----------( 212      ( 28 Jan 2024 09:21 )             44.3       01-28    143  |  106  |  21  ----------------------------<  100<H>  4.0   |  24  |  1.13    Ca    9.5      28 Jan 2024 09:21  Phos  3.2     01-28  Mg     1.8     01-28    TPro  7.2  /  Alb  4.1  /  TBili  0.8  /  DBili  x   /  AST  26  /  ALT  25  /  AlkPhos  100  01-28      PT/INR - ( 28 Jan 2024 09:21 )   PT: 13.8 sec;   INR: 1.22          PTT - ( 28 Jan 2024 09:21 )  PTT:36.5 sec          Urinalysis Basic - ( 28 Jan 2024 09:21 )    Color: x / Appearance: x / SG: x / pH: x  Gluc: 100 mg/dL / Ketone: x  / Bili: x / Urobili: x   Blood: x / Protein: x / Nitrite: x   Leuk Esterase: x / RBC: x / WBC x   Sq Epi: x / Non Sq Epi: x / Bacteria: x 14.5   7.60  )-----------( 212      ( 28 Jan 2024 09:21 )             44.3       01-28    143  |  106  |  21  ----------------------------<  100<H>  4.0   |  24  |  1.13    Ca    9.5      28 Jan 2024 09:21  Phos  3.2     01-28  Mg     1.8     01-28    TPro  7.2  /  Alb  4.1  /  TBili  0.8  /  DBili  x   /  AST  26  /  ALT  25  /  AlkPhos  100  01-28      PT/INR - ( 28 Jan 2024 09:21 )   PT: 13.8 sec;   INR: 1.22          PTT - ( 28 Jan 2024 09:21 )  PTT:36.5 sec          Urinalysis Basic - ( 28 Jan 2024 09:21 )    Color: x / Appearance: x / SG: x / pH: x  Gluc: 100 mg/dL / Ketone: x  / Bili: x / Urobili: x   Blood: x / Protein: x / Nitrite: x   Leuk Esterase: x / RBC: x / WBC x   Sq Epi: x / Non Sq Epi: x / Bacteria: x      < from: NAN Echo Doppler w/ Cont (01.25.24 @ 12:47) >    CONCLUSIONS:     1. Mildly reduced left ventricular systolic function.   2. Normal right ventricular size and systolic function.   3. Biatrial enlargement.   4. No LA/RA/JOSE/RAA thrombus seen.   5. There is moderate to severe, eccentric, posteriorly directed mitral   regurgitation.   6. Mild-to-moderate tricuspid regurgitation.   7. There ismild pulmonary hypertension, pulmonary artery systolic   pressure is 43 mmHg.   8. No pericardial effusion.   9. There is moderate non-mobile plaque seen in the visualized portion of   the descending aorta. There is moderate non-mobile plaque seen inthe   visualized portion of the aortic arch.    Mitral Valve:  MV Mean Grad:   1.0 mmHg MV Area, PHT:  MVA, 2D planim: 5.21 cm²    < end of copied text >      < from: TTE Echo Complete w/o Contrast w/ Doppler (01.25.24 @ 11:01) >    CONCLUSIONS:   1. Mildly reduced left ventricular systolic function.   2. Normal right ventricular size and systolic function.   3. Severely dilated left atrium.   4. Dilated right atrium.   5. Moderate eccentric mitral regurgitation.   6. Pulmonary artery systolic pressure is 42 mmHg.   7. No pericardial effusion.   8. No prior echo is available for comparison.    < end of copied text >

## 2024-01-28 NOTE — H&P ADULT - NSHPPHYSICALEXAM_GEN_ALL_CORE
Appearance: Normal	  HEENT: Normal oral mucosa, PERRL	  Neck: Supple, - JVD  Cardiovascular: Normal S1 S2, No JVD, No murmurs  Respiratory: Lungs clear to auscultation/No Rales, Rhonchi, Wheezing	  Gastrointestinal:  Soft, Non-tender, + BS	  Skin: No rashes, No ecchymoses, No cyanosis  Extremities: Normal range of motion, No clubbing, cyanosis or edema  Vascular: 2+ radial pulses brian, DP 1+ b/l, PT 1+ b/l  Neurologic: Non-focal  Psychiatry: A & O x 3, Mood & affect appropriate

## 2024-01-28 NOTE — H&P ADULT - HISTORY OF PRESENT ILLNESS
77 y/o male, PMHx HTN, HLD, recent [facility] hospitaliza (1/24/24-1/26/24) for new onset Aflutter RVR, presents 77 y/o male, w/ PMHx HTN, HLD, recent [facility] hospitalization (1/24/24-1/26/24) for new onset Aflutter RVR, presents 77 y/o male, w/ PMHx HTN, HLD, recent [facility] hospitalization (1/24/24-1/26/24) for new onset Aflutter RVR s/p DCCV- refused ablation at the time, presents c/o palpitations and wide complex tachycardia HR 230bpm. Pt reports feeling USOH after discharged from hospital, he woke up this AM, routinely checked his pulse and noted it to be very fast, then felt mild palpitations. Reports have not filled Toprol 50mg 2/2 not available at his pharmacy for a few days. Denies chest pain, sob, lightheadedness, dizziness, fever, chills, recent URI symptoms.     In ED: initial EKG noting wide complex tachycardia (likely Aflutter 1:1) w/ HR 231bpm, /79, R 18, SpO2 100% RA, T 98F. Pt underwent DCCV to sinus tachycardia 130s, subsequently converted to Afib RVR 160s, and received IV Lopressor 5mg and Lopressor 25mg PO, Amio 150mg bolus and converted to NSR 70s w RBBB.   CXR unremarkable. Labs s/f Trop neg x 1, , Mg 1.8. Other Meds given Fentanyl 50mcg IV prior to DCCV. Admitted to cardiac tele for wide complex tachycardia and EP eval.  77 y/o male, w/ PMHx HTN, HLD, recent [facility] hospitalization (1/24/24-1/26/24) for new onset Aflutter RVR s/p NAN/DCCV- refused ablation at the time, presents c/o palpitations and felt a very fast pulse. Pt reports feeling USOH after discharged from hospital, he woke up this AM, routinely checked his pulse and noted it to be very fast, then felt mild palpitations. Reports have not filled Toprol 50mg 2/2 not available at his pharmacy for a few days. Denies chest pain, sob, lightheadedness, dizziness, fever, chills, recent URI symptoms.     In ED: initial EKG noting wide complex tachycardia (likely Aflutter 1:1) w/ HR 231bpm, /79, R 18, SpO2 100% RA, T 98F. Pt underwent DCCV to sinus tachycardia 130s, subsequently converted to Afib RVR 160s, and received IV Lopressor 5mg and Lopressor 25mg PO, Amio 150mg bolus and converted to NSR 70s w/ RBBB.   CXR unremarkable. Labs s/f Trop neg x 1, , Mg 1.8. Other Meds given Fentanyl 50mcg IV prior to DCCV. Admitted to cardiac tele for wide complex tachycardia and EP eval.  77 y/o male, w/ PMHx HTN, HLD, recent [facility] hospitalization (1/24/24-1/26/24) for new onset Aflutter RVR and HFmrEF, s/p NAN/DCCV 1/25 - refused ablation, presents c/o palpitations and felt a very fast pulse. Pt reports feeling USOH after discharged from hospital, he woke up this AM, routinely checked his pulse and noted it to be very fast, then felt mild palpitations. Reports have not filled Toprol 50mg 2/2 not available at his pharmacy for a few days. Denies chest pain, sob, lightheadedness, dizziness, fever, chills, recent URI symptoms.     In ED: initial EKG noting wide complex tachycardia (likely Aflutter 1:1) w/ HR 231bpm, /79, R 18, SpO2 100% RA, T 98F. Pt underwent DCCV to sinus tachycardia 130s, subsequently converted to Afib RVR 160s, and received IV Lopressor 5mg and Lopressor 25mg PO, Amio 150mg bolus and converted to NSR 70s w/ RBBB.   CXR unremarkable. Labs s/f Trop neg x 1, , Mg 1.8. Other Meds given Fentanyl 50mcg IV prior to DCCV. Admitted to cardiac tele for wide complex tachycardia and EP eval.

## 2024-01-28 NOTE — H&P ADULT - PROBLEM SELECTOR PLAN 4
Lipid panel WNL, LDL 76, no known CAD  -Continue: Atorvastatin 20 mg QHS    F: No IVF  N: DASH/TLC diet  E: Replete lytes PRN K<4, Mg<2  P: DVT PPX: on Eliquis  C: FULL CODE  Dispo: Admit to tele 5 [facility] -Echo 1/25/24 w/ moderate MR  -NAN 1/25/24 w/ mod- severe, eccentric, posteriorly direct MR  -Pt was recommended to follow up outpt w/ DARLINE Magallanes

## 2024-01-28 NOTE — H&P ADULT - PROBLEM SELECTOR PLAN 5
Lipid panel WNL, LDL 76, no known CAD  -Continue: Atorvastatin 20 mg QHS    F: No IVF  N: DASH/TLC diet  E: Replete lytes PRN K<4, Mg<2  P: DVT PPX: on Eliquis  C: FULL CODE  Dispo: Admit to tele 5 [facility]

## 2024-01-28 NOTE — ED PROVIDER NOTE - OBJECTIVE STATEMENT
78M PMH HTN, HLD, AFib RVR, CHF p/w palpitations. Pt was just admitted for afib RVR, s/p cardioversion. Was feeling like usual self last night, awoke this morning w/ feeling of heart beating fast so came to ED. No other systemic symptoms. Pharmacy was not able to fill his toprol yet, taking all his other meds.   Denies f/c, URI symptoms, SOB/CP, NVD, abd pain, urinary complaints, LE pain/swelling, black/bloody stool.

## 2024-01-28 NOTE — H&P ADULT - PROBLEM SELECTOR PLAN 3
-Echo 1/25/24 w/ moderate (likely functional) MR  -NAN 1/25/24 w/ mod- severe, eccentric, posteriorly direct MR  -Pt to follow up w/ GARLANDD Dr Magallanes Euvolemic on exam. Did not require diuretics upon discharge.   -HF consulted last week, etiology likely tachy-induced CM iso Afib/Aflutter RVR  -Echo 1/25/24: EF 40-45%, mild conc LVH, severe dilated LA, dilated RA, mod MR  -HOLD Entresto 24/26mg BID given borderline hypotension 93/64, resume if improves  -HOLD Toprol 50mg qd 2/2 sinus jennifer 40s  -HOLD Farxiga 10mg qd 2/2 NPO ablation 1/29 Euvolemic on exam. Did not require diuretics upon discharge.   -HF consulted last week, etiology likely tachy-induced CM in setting of Afib/Aflutter RVR  -Echo 1/25/24: EF 40-45%, mild conc LVH, severe dilated LA, dilated RA, mod MR  -c/w Entresto 24/26mg BID w/ hold parameters  -HOLD Toprol 50mg qd 2/2 sinus jennifer 40s  -HOLD Farxiga 10mg qd 2/2 NPO for ablation 1/29

## 2024-01-28 NOTE — ED PROVIDER NOTE - PROGRESS NOTE DETAILS
[place]: INR 1.22, trop 22, , other labs grossly wnl. Pt remains stable. Evaluated by CCU fellow, recommending amio and regular tele admit.

## 2024-01-28 NOTE — ED ADULT NURSE NOTE - OBJECTIVE STATEMENT
78y Male A&ox3 to ED c/o palpitations and tachycardia. HR noted to be in 200s. Upgraded to MD Michel. Pt placed on pads and bedside cardiac monitoring and o2 sat. Hx of HTN, HLD, AFib RVR, CHF. Pt reports waking up and feeling palpitations. Pt not taking Toprol st this time due to not being able to fill scrip at pharm. Recent admit and d/c for a-fib. Pt denies chest pain, dizziness, sob, fever, chills n/v/d.

## 2024-01-28 NOTE — ED ADULT NURSE REASSESSMENT NOTE - NS ED NURSE REASSESS COMMENT FT1
Synchronized cardioversion performed with 200J. MD Michel at bedside. Pt A&ox3 at this time. Pt denies dizziness, chest pain, nor sob at this time. Rhythm strip being obtained.

## 2024-01-28 NOTE — ED PROVIDER NOTE - CLINICAL SUMMARY MEDICAL DECISION MAKING FREE TEXT BOX
78M PMH HTN, HLD, AFib RVR, CHF p/w palpitations. Pt was just admitted for afib RVR, s/p cardioversion. Was feeling like usual self last night, awoke this morning w/ feeling of heart beating fast so came to ED. No other systemic symptoms. Pharmacy was not able to fill his toprol yet, taking all his other meds.   Pt tachycardic, other vitals wnl. Exam as above.  ddx: Regular wide complex tachycardia - concerning for vtach.   Pt HD stable but has wide complex and recent echo, d/w pt risks/benefits of cardioversion. Pt verbally consented. Was feeling mild lightheaded just prior to cardioversion (which may have been 2/2 fenanyl push) but otherwise had no significant symptoms. Cardioverted x1, immediately went to sinus taach ~110 but around 20-30 seconds later went to afib RVR ~160s. Given 5 metoprolol IV, now NSR, narrow complex.   Labs pending, CCU consulted. Pt stable.

## 2024-01-28 NOTE — H&P ADULT - NSICDXPASTMEDICALHX_GEN_ALL_CORE_FT
PAST MEDICAL HISTORY:  Atrial fibrillation and flutter     HLD (hyperlipidemia)     HTN (hypertension)

## 2024-01-28 NOTE — ED ADULT NURSE NOTE - CHIEF COMPLAINT QUOTE
Pt c/o palpitations x Friday. Pt seen and d/c'd Friday for same complaint, cardioversion on Thursday. Pt recently started on eliquis. C/o WISE, mild chest discomfort. EKG in progress.

## 2024-01-28 NOTE — H&P ADULT - ASSESSMENT
79 y/o male, w/ PMHx HTN, HLD, recent [facility] hospitalization (1/24/24-1/26/24) for new onset Aflutter RVR s/p DCCV- refused ablation at the time, presents c/o palpitations and wide complex tachycardia HR 230bpm. Found to be in wide complex tachycardia (likely Aflutter 1:1) w/ HR 231bpm, s/p DCCV to sinus tachycardia 130s, subsequently converted to Afib RVR 160s, and received IV Lopressor 5mg and converted to NSR 70s w RBBB.  Admitted to cardiac tele for wide complex tachycardia and EP eval.  77 y/o male, w/ PMHx HTN, HLD, recent [facility] hospitalization (1/24/24-1/26/24) for new onset Aflutter RVR s/p NAN/DCCV- refused ablation at the time, presents c/o palpitations upon waking up in AM w/ rapid pulse. Found to be in wide complex tachycardia (likely Aflutter 1:1) w/ HR 231bpm, s/p DCCV to sinus tachycardia 130s, subsequently converted to Afib RVR 160s, and received IV Lopressor 5mg and converted to NSR 70s w/ RBBB.  Admitted to cardiac tele for wide complex tachycardia and EP eval. NPO for EPS/Aflutter ablation 1/29/24. 79 y/o male, w/ PMHx HTN, HLD, recent [facility] hospitalization (1/24/24-1/26/24) for new onset Aflutter RVR and HFmrEF, s/p NAN/DCCV 1/25 - refused ablation, presents c/o palpitations upon waking up in AM w/ rapid pulse. Found to be in wide complex tachycardia (likely Aflutter 1:1) w/ HR 231bpm, s/p DCCV to sinus tachycardia 130s, subsequently converted to Afib RVR 160s, and s/p IV Lopressor 5mg and converted to NSR 70s w/ RBBB.  Admitted to cardiac tele for wide complex tachycardia and EP eval. NPO for EPS/Aflutter ablation 1/29/24.

## 2024-01-28 NOTE — H&P ADULT - PROBLEM SELECTOR PLAN 1
Initial EKG w/ wide complex tachycardia (likely Aflutter 1:1) w/ HR 231bpm, s/p DCCV to sinus tachycardia 130s, subsequently converted to Afib RVR 160s, and received IV Lopressor 5mg and Lopressor 25mg PO, and converted to NSR 70s w/ RBBB.  -S/p Amiodarone 150mg bolus, followed by Amio gtt 1mg/kg   -Decrease Amio gtt to 0.5mg/kg given sinus bradycardia w/ HR 40s  -EP consulted.   -Plan for EPS/Aflutter ablation 1/29/24. NPO s/p MN  -Recent Stress echo [facility] 12/2023: negative for ischemia

## 2024-01-28 NOTE — PROGRESS NOTE ADULT - SUBJECTIVE AND OBJECTIVE BOX
REASON FOR CONSULT:  WCT    HISTORY OF PRESENT ILLNESS:  Mr. Pat is a 77 y/o male, w/ PMHx HTN, HLD, recent [facility] hospitalization (1/24/24-1/26/24) for new onset Aflutter RVR and HFmrEF, s/p NAN/DCCV 1/25 - refused ablation, presents c/o palpitations and felt a very fast pulse. Pt reports feeling USOH after discharged from hospital, he woke up this AM, routinely checked his pulse and noted it to be very fast, then felt mild palpitations. Reports have not filled Toprol 50mg 2/2 not available at his pharmacy for a few days.  In ED, initial EKG noting wide complex tachycardia (likely Aflutter 1:1) w/ HR 231bpm, /79, R 18, SpO2 100% RA, T 98F. Pt underwent DCCV to sinus tachycardia 130s, subsequently converted to Afib RVR 160s, and received IV Lopressor 5mg and Lopressor 25mg PO, Amio 150mg bolus and converted to NSR 70s w/ RBBB. Currently, denies chest pain, sob, lightheadedness, dizziness, fever, chills, recent URI symptom      PAST MEDICAL & SURGICAL HISTORY:  HTN (hypertension)  HLD (hyperlipidemia)  Atrial fibrillation and flutter  H/O foot surgery  H/O tooth extraction      MEDICATIONS:  aMIOdarone Infusion 0.501 mG/Min IV Continuous <Continuous>  atorvastatin 20 milliGRAM(s) Oral at bedtime  apixaban 5 milliGRAM(s) Oral every 12 hours  influenza  Vaccine (HIGH DOSE) 0.7 milliLiter(s) IntraMuscular once        ALLERGIES:  PT REPORTS HX OF REACTION TO GENERAL AND LOCAL ANESTHESIA - UNABLE TO MOVE X1 DAY - UNCLEAR WHICH MEDICATION CAUSING THIS (Unknown)  	      SOCIAL HISTORY:    Denies illicit drug use  Denies smoking history  Denies excessive alcohol intake        REVIEW OF SYSTEMS:    CONSTITUTIONAL: No fever, weight loss, or fatigue  EYES: No eye pain, visual disturbances, or discharge  ENMT:  No difficulty hearing, tinnitus, vertigo; No sinus or throat pain  NECK: No pain or stiffness  BREASTS: No pain, masses, or nipple discharge  RESPIRATORY: No cough, wheezing, chills or hemoptysis; No Shortness of Breath  CARDIOVASCULAR: No chest pain, palpitations, dizziness, or leg swelling  GASTROINTESTINAL: No abdominal or epigastric pain. No nausea, vomiting, or hematemesis; No diarrhea or constipation. No melena or hematochezia.  GENITOURINARY: No dysuria, frequency, hematuria, or incontinence  NEUROLOGICAL: No headaches, memory loss, loss of strength, numbness, or tremors  SKIN: No itching, burning, rashes, or lesions   LYMPH Nodes: No enlarged glands  ENDOCRINE: No heat or cold intolerance; No hair loss  MUSCULOSKELETAL: No joint pain or swelling; No muscle, back, or extremity pain  PSYCHIATRIC: No depression, anxiety, mood swings, or difficulty sleeping  HEME/LYMPH: No easy bruising, or bleeding gums  ALLERY AND IMMUNOLOGIC: No hives or eczema	      PHYSICAL EXAM:  T(C): 36.8 (01-28-24 @ 14:17), Max: 36.8 (01-28-24 @ 11:24)  HR: 46 (01-28-24 @ 14:17) (46 - 231)  BP: 108/71 (01-28-24 @ 14:17) (93/64 - 134/79)  RR: 18 (01-28-24 @ 14:17) (17 - 18)  SpO2: 95% (01-28-24 @ 14:17) (95% - 100%)  Wt(kg): --    Appearance: Normal	  HEENT:   Normal oral mucosa, PERRL, EOMI	  Cardiovascular: Normal S1 S2, No JVD, No murmurs, No edema  Respiratory: Lungs clear to auscultation	  Gastrointestinal:  Soft, Non-tender, + BS	  Neurologic: A&O x 3, Non-focal  Extremities: Normal range of motion, No clubbing, cyanosis or edema  Vascular: Peripheral pulses palpable 2+ bilaterally    	  LABS:	 	    CARDIAC MARKERS:                                  14.5   7.60  )-----------( 212      ( 28 Jan 2024 09:21 )             44.3     01-28    143  |  106  |  21  ----------------------------<  100<H>  4.0   |  24  |  1.13    Ca    9.5      28 Jan 2024 09:21  Phos  3.2     01-28  Mg     1.8     01-28    TPro  7.2  /  Alb  4.1  /  TBili  0.8  /  DBili  x   /  AST  26  /  ALT  25  /  AlkPhos  100  01-28    proBNP:   Lipid Profile:   HgA1c:   TSH:         PERTINENT DIAGNOSTIC TESTING:    Echocardiogram:  Pending (previously mildly reduced EF, mod to severe eccentric MR, pulm HTN)  Telemetry:  NSR with frequent atrial ectopy  EKG:  Wide complex tachy at 231bpm with RBBB morphology and superior axis

## 2024-01-28 NOTE — PATIENT PROFILE ADULT - FUNCTIONAL ASSESSMENT - BASIC MOBILITY 6.
4-calculated by average/Not able to assess (calculate score using [facility] averaging method)

## 2024-01-28 NOTE — ED ADULT NURSE NOTE - NS ED TRIAGE CLINICAL UPGRADE
5
Deteriorating patient status - Patient was clinically upgraded due to deteriorating patient status.

## 2024-01-28 NOTE — H&P ADULT - NSHPREVIEWOFSYSTEMS_GEN_ALL_CORE
GENERAL, CONSTITUTIONAL : denies recent weight loss, fever, chills  EYES, VISION: denies changes in vision   EARS, NOSE, THROAT: denies hearing loss  HEART, CARDIOVASCULAR: denies chest pain, SOB,  LE edema, claudication. +arrhythmia, palpitations  RESPIRATORY: Denies cough, SOB, wheezing, PND, orthopnea  GASTROINTESTINAL: Denies abdominal pain, epigastric pain, nausea, vomiting, or hematemesis, diarrhea, constipation, melena or hematochezia.  GENITOURINARY: Denies frequent urination, urgency  MUSCULOSKELETAL denies joint pain or swelling, restricted motion, musculoskeletal pain.   SKIN & INTEGUMENTARY Denies rashes, sores, blisters, blisters, growths.  NEUROLOGICAL: Denies numbness or tingling sensations, sensation loss, burning.   PSYCHIATRIC: Denies nervousness, anxiety, depression  ENDOCRINE Denies heat or cold intolerance, excessive thirst  HEMATOLOGIC/LYMPHATIC: Denies abnormal bleeding, bleeding of any kind

## 2024-01-29 ENCOUNTER — TRANSCRIPTION ENCOUNTER (OUTPATIENT)
Age: 79
End: 2024-01-29

## 2024-01-29 PROBLEM — Z00.00 ENCOUNTER FOR PREVENTIVE HEALTH EXAMINATION: Status: ACTIVE | Noted: 2024-01-29

## 2024-01-29 LAB
ANION GAP SERPL CALC-SCNC: 9 MMOL/L — SIGNIFICANT CHANGE UP (ref 5–17)
BLD GP AB SCN SERPL QL: NEGATIVE — SIGNIFICANT CHANGE UP
BUN SERPL-MCNC: 25 MG/DL — HIGH (ref 7–23)
CALCIUM SERPL-MCNC: 9.2 MG/DL — SIGNIFICANT CHANGE UP (ref 8.4–10.5)
CHLORIDE SERPL-SCNC: 106 MMOL/L — SIGNIFICANT CHANGE UP (ref 96–108)
CO2 SERPL-SCNC: 27 MMOL/L — SIGNIFICANT CHANGE UP (ref 22–31)
CREAT SERPL-MCNC: 1.19 MG/DL — SIGNIFICANT CHANGE UP (ref 0.5–1.3)
EGFR: 63 ML/MIN/1.73M2 — SIGNIFICANT CHANGE UP
GLUCOSE SERPL-MCNC: 108 MG/DL — HIGH (ref 70–99)
HCT VFR BLD CALC: 40.2 % — SIGNIFICANT CHANGE UP (ref 39–50)
HGB BLD-MCNC: 13 G/DL — SIGNIFICANT CHANGE UP (ref 13–17)
MAGNESIUM SERPL-MCNC: 2.3 MG/DL — SIGNIFICANT CHANGE UP (ref 1.6–2.6)
MCHC RBC-ENTMCNC: 27.3 PG — SIGNIFICANT CHANGE UP (ref 27–34)
MCHC RBC-ENTMCNC: 32.3 GM/DL — SIGNIFICANT CHANGE UP (ref 32–36)
MCV RBC AUTO: 84.5 FL — SIGNIFICANT CHANGE UP (ref 80–100)
NRBC # BLD: 0 /100 WBCS — SIGNIFICANT CHANGE UP (ref 0–0)
PLATELET # BLD AUTO: 185 K/UL — SIGNIFICANT CHANGE UP (ref 150–400)
POTASSIUM SERPL-MCNC: 4.3 MMOL/L — SIGNIFICANT CHANGE UP (ref 3.5–5.3)
POTASSIUM SERPL-SCNC: 4.3 MMOL/L — SIGNIFICANT CHANGE UP (ref 3.5–5.3)
RBC # BLD: 4.76 M/UL — SIGNIFICANT CHANGE UP (ref 4.2–5.8)
RBC # FLD: 13 % — SIGNIFICANT CHANGE UP (ref 10.3–14.5)
RH IG SCN BLD-IMP: POSITIVE — SIGNIFICANT CHANGE UP
SODIUM SERPL-SCNC: 142 MMOL/L — SIGNIFICANT CHANGE UP (ref 135–145)
TROPONIN T, HIGH SENSITIVITY RESULT: 22 NG/L — SIGNIFICANT CHANGE UP (ref 0–51)
WBC # BLD: 7.36 K/UL — SIGNIFICANT CHANGE UP (ref 3.8–10.5)
WBC # FLD AUTO: 7.36 K/UL — SIGNIFICANT CHANGE UP (ref 3.8–10.5)

## 2024-01-29 PROCEDURE — 93010 ELECTROCARDIOGRAM REPORT: CPT

## 2024-01-29 PROCEDURE — 93653 COMPRE EP EVAL TX SVT: CPT

## 2024-01-29 RX ORDER — AMIODARONE HYDROCHLORIDE 400 MG/1
0.5 TABLET ORAL
Qty: 450 | Refills: 0 | Status: DISCONTINUED | OUTPATIENT
Start: 2024-01-29 | End: 2024-01-29

## 2024-01-29 RX ADMIN — ATORVASTATIN CALCIUM 20 MILLIGRAM(S): 80 TABLET, FILM COATED ORAL at 21:32

## 2024-01-29 RX ADMIN — APIXABAN 5 MILLIGRAM(S): 2.5 TABLET, FILM COATED ORAL at 05:35

## 2024-01-29 RX ADMIN — SACUBITRIL AND VALSARTAN 1 TABLET(S): 24; 26 TABLET, FILM COATED ORAL at 05:35

## 2024-01-29 RX ADMIN — APIXABAN 5 MILLIGRAM(S): 2.5 TABLET, FILM COATED ORAL at 17:39

## 2024-01-29 RX ADMIN — AMIODARONE HYDROCHLORIDE 16.7 MG/MIN: 400 TABLET ORAL at 10:12

## 2024-01-29 RX ADMIN — SACUBITRIL AND VALSARTAN 1 TABLET(S): 24; 26 TABLET, FILM COATED ORAL at 17:39

## 2024-01-29 NOTE — DISCHARGE NOTE PROVIDER - NSDCCPCAREPLAN_GEN_ALL_CORE_FT
PRINCIPAL DISCHARGE DIAGNOSIS  Diagnosis: Atrial flutter  Assessment and Plan of Treatment: When you presented, your heart was in an irregular rhythm called atrial fibrillation. We did a procedure called a Cardioversion, which puts your heart back into a normal sinus rhythm.  You have a history of Atrial Fibrillation. This means your atrium do not contract properly and blood does not efficiently get pumped into the ventricles. This may lead to blood abnormally pooling in the ventricles and causing it to clot. This puts you at an increased risk for a stroke. Therefore, we started you on a heart rate controlling medication called (insert medication name here) and a blood-thinning medication called (insert medication name here).      SECONDARY DISCHARGE DIAGNOSES  Diagnosis: MR (mitral regurgitation)  Assessment and Plan of Treatment: You have a history of Mitral Valve Regurgitation should continue to follow up with your cardiologist upon discaharge    Diagnosis: (HFpEF) heart failure with preserved ejection fraction  Assessment and Plan of Treatment: You have a history of heart failure and should continue your daily lasix and spironolactone. You should weigh yourself daily and if you notice a weight gain of more than 2-3 pounds in 2 days, shortness of breath, or lower extremity swelling you should contact your doctor immediately. Please restrict your fluid intake to 1-2L daily.    Diagnosis: HLD (hyperlipidemia)  Assessment and Plan of Treatment: Your cholesterol panel is abnormal. Your LDL is (INSERT) mg/dL and your goal LDL is less than 70 mg/dL. LDL is also known as "bad cholesterol" because it takes cholesterol to your arteries, where it may collect in artery walls. Too much cholesterol in your arteries may lead to a buildup of plaque known as atherosclerosis and can cause heart disease. Your HDL is (INSERT) mg/dL and your goal HDL is greater than 50 mg/dL. The higher the HDL the better; HDL is known as “good cholesterol” because it transports cholesterol to your liver to be expelled from your body.     PRINCIPAL DISCHARGE DIAGNOSIS  Diagnosis: Atrial flutter  Assessment and Plan of Treatment: You have an abnormal heart rhythm (arrhythmia) called atrial fibrillation. With this condition, the hearts 2 upper chambers (the atria) quiver rather than squeeze the blood out in a normal pattern. This leads to an irregular and sometimes rapid heartbeat. Atrial fibrillation is serious condition as it affects the heart’s ability to fill with blood, and the blood can start to form clots.  These clots can travel to the brain through the blood vessels, and cause strokes.  You underwent an ablation during this admission, which got rid of the pathway that caused you to go into this abnormal rhythm.   -Please CONTINUE  ELIQUIS 5MG TWICE A DAY to prevent a stroke by helping to prevent clots from forming.   -Please CONTINUE _____ TWICE A DAY to keep your heart rate regular  -Please CONTINUE _____ DAILY to keep your heart in normal rhythm  -Please follow up with Dr. Patrick 2/28 at 11:30. [phone].        SECONDARY DISCHARGE DIAGNOSES  Diagnosis: HLD (hyperlipidemia)  Assessment and Plan of Treatment: Please continue atorvastatin 20mg at bedtime to keep your cholesterol low. High cholesterol contributes to heart disease.      Diagnosis: MR (mitral regurgitation)  Assessment and Plan of Treatment: You have a history of Mitral Valve Regurgitation should continue to follow up with your cardiologist upon discaharge    Diagnosis: (HFpEF) heart failure with preserved ejection fraction  Assessment and Plan of Treatment: You have a history of heart failure and should continue your daily lasix and spironolactone. You should weigh yourself daily and if you notice a weight gain of more than 2-3 pounds in 2 days, shortness of breath, or lower extremity swelling you should contact your doctor immediately. Please restrict your fluid intake to 1-2L daily.     PRINCIPAL DISCHARGE DIAGNOSIS  Diagnosis: Atrial flutter  Assessment and Plan of Treatment: You have an abnormal heart rhythm (arrhythmia) called atrial fibrillation. With this condition, the hearts 2 upper chambers (the atria) quiver rather than squeeze the blood out in a normal pattern. This leads to an irregular and sometimes rapid heartbeat. Atrial fibrillation is serious condition as it affects the heart’s ability to fill with blood, and the blood can start to form clots.  These clots can travel to the brain through the blood vessels, and cause strokes.  You underwent an ablation during this admission, which got rid of the pathway that caused you to go into this abnormal rhythm.   -Please CONTINUE  ELIQUIS 5MG TWICE A DAY to prevent a stroke by helping to prevent clots from forming.   -Please CONTINUE TOPROL 25MG DAILY AT BEDTIME to keep your heart rate regular  -Please follow up with Dr. Patrick 2/28 at 11:30. [phone].        SECONDARY DISCHARGE DIAGNOSES  Diagnosis: HLD (hyperlipidemia)  Assessment and Plan of Treatment: Please continue atorvastatin 20mg at bedtime to keep your cholesterol low. High cholesterol contributes to heart disease.      Diagnosis: MR (mitral regurgitation)  Assessment and Plan of Treatment: You have a history of Mitral Valve Regurgitation should continue to follow up with your cardiologist upon discaharge    Diagnosis: (HFpEF) heart failure with preserved ejection fraction  Assessment and Plan of Treatment: You have a history of heart failure and should continue your daily lasix and spironolactone. You should weigh yourself daily and if you notice a weight gain of more than 2-3 pounds in 2 days, shortness of breath, or lower extremity swelling you should contact your doctor immediately. Please restrict your fluid intake to 1-2L daily. Please follow up with your cardiologist 1-2 weeks after discharge.     PRINCIPAL DISCHARGE DIAGNOSIS  Diagnosis: Atrial flutter  Assessment and Plan of Treatment: You have an abnormal heart rhythm (arrhythmia) called atrial fibrillation. With this condition, the hearts 2 upper chambers (the atria) quiver rather than squeeze the blood out in a normal pattern. This leads to an irregular and sometimes rapid heartbeat. Atrial fibrillation is serious condition as it affects the heart’s ability to fill with blood, and the blood can start to form clots.  These clots can travel to the brain through the blood vessels, and cause strokes.  You underwent an ablation during this admission, which got rid of the pathway that caused you to go into this abnormal rhythm.   -Please CONTINUE  ELIQUIS 5MG TWICE A DAY to prevent a stroke by helping to prevent clots from forming.   -Please CONTINUE TOPROL 25MG DAILY AT BEDTIME to keep your heart rate regular  -Please follow up with Dr. Patrick 2/28 at 11:30. [phone].        SECONDARY DISCHARGE DIAGNOSES  Diagnosis: HLD (hyperlipidemia)  Assessment and Plan of Treatment: Please continue atorvastatin 20mg at bedtime to keep your cholesterol low. High cholesterol contributes to heart disease.      Diagnosis: MR (mitral regurgitation)  Assessment and Plan of Treatment: You have a history of Mitral Valve Regurgitation should continue to follow up with your cardiologist upon discaharge    Diagnosis: (HFpEF) heart failure with preserved ejection fraction  Assessment and Plan of Treatment: -Heart failure is a condition that occurs when the heart cannot pump blood as well as it should; this leads to inadequate blood flow to vital organs such as the kidneys and congestion (buildup of fluid) in other vital organs such as the lungs.  This can lead to symptoms such as swelling, trouble breathing, and feeling tired.   -You have a history of weakened heart muscle called systolic congestive heart failure. When the heart pumps, it doesn't squeeze normally.  Your Ejection Fraction (EF) is 45%, a normal EF is 55-60%.   -Please continue spironolactone 25mg daily to prevent fluid build up in the body.  -Avoid drinking more than 1.5L of fluid daily and maintain a low salt diet (max 2grams daily).  -Please weigh yourself daily, for any significant increases in daily weight of 2lbs/day or 5lbs/week with associated swelling in the legs or abdomen and/or shortness of breath, please call your doctor or go to the emergency room.  -Please make sure you follow up with your cardiologist Dr. KRISHNAN on 2/5/24 as scheduled.        PRINCIPAL DISCHARGE DIAGNOSIS  Diagnosis: Atrial flutter  Assessment and Plan of Treatment: You have an abnormal heart rhythm (arrhythmia) called atrial fibrillation. With this condition, the hearts 2 upper chambers (the atria) quiver rather than squeeze the blood out in a normal pattern. This leads to an irregular and sometimes rapid heartbeat. Atrial fibrillation is serious condition as it affects the heart’s ability to fill with blood, and the blood can start to form clots.  These clots can travel to the brain through the blood vessels, and cause strokes.  You underwent an ablation during this admission, which got rid of the pathway that caused you to go into this abnormal rhythm.   -Please CONTINUE  ELIQUIS 5MG TWICE A DAY to prevent a stroke by helping to prevent clots from forming.   -Please CONTINUE TOPROL 25MG DAILY AT BEDTIME to keep your heart rate regular  -Please follow up with Dr. Patrick 2/28 at 11:30. [phone].        SECONDARY DISCHARGE DIAGNOSES  Diagnosis: HLD (hyperlipidemia)  Assessment and Plan of Treatment: Please continue atorvastatin 20mg at bedtime to keep your cholesterol low. High cholesterol contributes to heart disease.      Diagnosis: MR (mitral regurgitation)  Assessment and Plan of Treatment: You have a history of Mitral Valve Regurgitation should continue to follow up with your cardiologist upon discaharge    Diagnosis: (HFpEF) heart failure with preserved ejection fraction  Assessment and Plan of Treatment: -Heart failure is a condition that occurs when the heart cannot pump blood as well as it should; this leads to inadequate blood flow to vital organs such as the kidneys and congestion (buildup of fluid) in other vital organs such as the lungs.  This can lead to symptoms such as swelling, trouble breathing, and feeling tired.   -You have a history of weakened heart muscle called systolic congestive heart failure. When the heart pumps, it doesn't squeeze normally.  Your Ejection Fraction (EF) is 45%, a normal EF is 55-60%.   -Avoid drinking more than 1.5L of fluid daily and maintain a low salt diet (max 2grams daily).  -Please weigh yourself daily, for any significant increases in daily weight of 2lbs/day or 5lbs/week with associated swelling in the legs or abdomen and/or shortness of breath, please call your doctor or go to the emergency room.  -Please make sure you follow up with your cardiologist Dr. KRISHNAN on 2/5/24 as scheduled.

## 2024-01-29 NOTE — PROGRESS NOTE ADULT - PROBLEM SELECTOR PLAN 4
-Echo 1/25/24 w/ moderate MR  -NAN 1/25/24 w/ mod- severe, eccentric, posteriorly direct MR  -Pt was recommended to follow up outpt w/ DARLINE Magallanes - Lipid panel WNL, LDL 76, no known CAD  -Continue: Atorvastatin 20 mg QHS    F: No IVF  N: DASH/TLC diet  E: Replete lytes PRN K<4, Mg<2  P: DVT PPX: on Eliquis  C: FULL CODE  Dispo: Admit to tele 5 Uris

## 2024-01-29 NOTE — DISCHARGE NOTE PROVIDER - CARE PROVIDERS DIRECT ADDRESSES
,[email or web address]

## 2024-01-29 NOTE — PROGRESS NOTE ADULT - PROBLEM SELECTOR PLAN 5
- Lipid panel WNL, LDL 76, no known CAD  -Continue: Atorvastatin 20 mg QHS    F: No IVF  N: DASH/TLC diet  E: Replete lytes PRN K<4, Mg<2  P: DVT PPX: on Eliquis  C: FULL CODE  Dispo: Admit to tele 5 [facility]ris

## 2024-01-29 NOTE — PRE-ANESTHESIA EVALUATION ADULT - NSANTHOSAYNRD_GEN_A_CORE
No. MARLIN screening performed.  STOP BANG Legend: 0-2 = LOW Risk; 3-4 = INTERMEDIATE Risk; 5-8 = HIGH Risk

## 2024-01-29 NOTE — DISCHARGE NOTE PROVIDER - NSDCFUADDAPPT_GEN_ALL_CORE_FT
Please follow up with CHEKO Valentine.  Please follow up with EP Dr. Patrick 2/28 at 11:30 [phone]. Please follow up with EP Dr. Patrick 2/28 at 11:30 [phone].    Please follow up with your cardiologist Dr. KRISHNAN as scheduled on Monday 2/5/24

## 2024-01-29 NOTE — PROGRESS NOTE ADULT - ASSESSMENT
Mr. Pat is a 78 year-old male with HTN, HLD, systolic dysfunction, and atrial flutter/fibrillation admitted with a wide complex tachy treated with DC cardioversion.  Suspect that WCT is flutter with 1:1 conduction in the setting of no beta blocker.  Will proceed with EP study and ablation (flutter +/- fibrillation).      -NPO at [place]  -start lopressor 25mg q12 (hold for systolic < 90mmHg)  -ok with amio gtt until ablation  -maintain Eliquis 5mg bid for stroke prevention
79 y/o male, w/ PMHx HTN, HLD, recent [facility] hospitalization (1/24/24-1/26/24) for new onset Aflutter RVR and HFmrEF, s/p NAN/DCCV 1/25 - refused ablation, presents c/o palpitations upon waking up in AM w/ rapid pulse. Found to be in wide complex tachycardia (likely Aflutter 1:1) w/ HR 231bpm, s/p DCCV to sinus tachycardia 130s, subsequently converted to Afib RVR 160s, and s/p IV Lopressor 5mg and converted to NSR 70s w/ RBBB.  Admitted to cardiac tele for wide complex tachycardia and EP eval now s/p ablation for typical aflutter.

## 2024-01-29 NOTE — DISCHARGE NOTE PROVIDER - CARE PROVIDER_API CALL
Remberto Valentine  Cardiac Electrophysiology  [address], [address] [place] [address]  Phone: [phone]  Fax: [phone]  Follow Up Time: 2 weeks   Ismael Brown  Cardiac Electrophysiology  [address], [address] [place] [address]  Phone: [phone]  Fax: [phone]  Scheduled Appointment: 02/28/2024 11:30 AM   Ismael Brown  Cardiac Electrophysiology  [address], [address] [place] [address]  Phone: [phone]  Fax: [phone]  Scheduled Appointment: 02/28/2024 11:30 AM    ORAL KRISHNAN  [address]  [place] [address]  Phone: ()-  Fax: ()-  Follow Up Time: 2 weeks

## 2024-01-29 NOTE — DISCHARGE NOTE PROVIDER - NSDCFUSCHEDAPPT_GEN_ALL_CORE_FT
Registered Dietitian Follow-Up     Patient Profile Reviewed                           Yes [x]   No []     Nutrition History Previously Obtained        Yes [x]  No []       Pertinent Medical Interventions: Admitted s/p fall. Noted non-ST elevation ACS v/s Type II MI noted 2/2 demand ischemia from acute blood loss anemia. Acute blood loss anemia (Hg dropped to 5) - Operative site hematoma v/s possible upper GI bleeding (hematemesis?) --> currently stable at 9.9 per progress notes. Endoscopy deferred d/t no active bleeding. DM & HTN noted. CKD III noted. h/o colon CA noted.     Diet order: Dysphagia 2 mechanical soft diet with thin liquids + Consistent Carbohydrate (evening snack) + Glucerna q12hrs + Ensure Pudding q24hrs.     Anthropometrics:  - Ht: 160 cm.  - Wt: 61.8 kg (9/14). Wt has fluctuated from 50.7 kg (9/5) to 61.8 kg (9/14). ? etiology of wt fluctuations. Edema noted at this time. Requested new tared wt.  - BMI: 24 (using current dosing wt 61.3 kg 9/14)  - IBW: 52kg     Pertinent Lab Data: 9/15: RBC-3.50, H/H-9.9/30.4, K-5.2, BUN-55, creat-1.8, gluc-154, POCT gluc-211 (17:14) vs 233 (11:22); 9/13: PO4-2.2 (WDL) 9/5: XpqS7K-1.3%     Pertinent Meds: heparin, abx, insulin lispro, atorvastatin, protonix, vit C, bisacodyl, calcitriol, FeSO4, folic acid, MVI, zofran, senna     Physical Findings:  - Appearance: 2+ edema (B/L ankle)  - GI function: last BM 9/15; diarrhea persists; plan for c.diff eval  - Tubes: No feeding tubes  - Oral/Mouth cavity: on modified diet at this time - no SLP eval present  - Skin: ecchymosis, surgical incision     Nutrition Requirements  Weight Used: 50.7kg -Derived from nutrition note (9/7) - estimated needs derived here are similar to what would be estimated by using IBW 52 kg. Given wt discrepancy, would opt to assess nutrient needs via IBW. Assessed needs below are similar to what would be estimated using IBW.     Estimated Energy Needs    Continue [x]  Adjust []  1112-1205kcal (MSJ 1.2-1.3AF) -Derived from nutrition note (9/7)      Estimated Protein Needs    Continue [x]  Adjust []  61-64g/day (1.2-1.3 g/kg -- d/t recent sx vs. renal fx noted -- will mon) -Derived from nutrition note (9/7)     Estimated Fluid Needs        Continue [x]  Adjust []  1mL/kcal or LIP -Derived from nutrition note (9/7)      Nutrient Intake: Appetite reportedly poor; consuming 50% of meals at this time.     [x] Previous Nutrition Diagnosis: Inadequate protein energy intake            [x] Ongoing          [] Resolved     Nutrition Intervention: Meals & Snacks, Coordination of Care     Goal/Expected Outcome: Pt to demonstrate tolerance to diet order, with at least 75% po intake achieved over next 6 days. Pt at moderate nutrition risk.     Indicator/Monitoring: Energy intake, glucose profile, renal profile, nutrition focused physical findings, body composition.    Recommendations: Consult SLP services to evaluate swallow function in setting of poor po intake this admit in pt on modified diet. Diet texture/consistency per SLP. RD to monitor K+ levels & need to add potassium restriction. Will refrain from adding K+ restriction at this time to avoid further restricting diet in setting of inadequate oral intake. Ismael Brown  [facility] Physician [facility]  [facility] [address]  Scheduled Appointment: 02/28/2024

## 2024-01-29 NOTE — CHART NOTE - NSCHARTNOTEFT_GEN_A_CORE
Patient is status post CTI ablation for typical atrial flutter.   There were no complications.   During this procedure, atrial fibrillation was induced requiring cardioversion.   No convincing aberrancy was able to be induced.   R femoral vein access (7 Fr, 8 Fr). Hemostasis obtained via vascade device x 2.   3 hours bedrest. Continue uninterrupted anticoagulation.   Will obtain CTA coronary prior to discharge to aid in decision regarding antiarrhythmic therapy.

## 2024-01-29 NOTE — PROGRESS NOTE ADULT - PROBLEM SELECTOR PLAN 1
- Initial EKG w/ wide complex tachycardia (likely Aflutter 1:1) w/ HR 231bpm, s/p DCCV to sinus tachycardia 130s, subsequently converted to Afib RVR 160s, and received IV Lopressor 5mg and Lopressor 25mg PO, and converted to NSR 70s w/ RBBB s/p Amiodarone 150mg bolus, followed by Amio gtt 1mg/kg, decreased to 0.5 mg/kg given sinus jennifer (HR 40s-50s)   - s/p ablation for typical aflutter. As per EP, afib induced during procedure requiring cardioversion. F/u CTA of coronaries prior to discharge to aid in decision regarding antiarrhythmic therapy.   - NAN (1/25/24): LVEF 40-45% with global hypokinesis. No LA/RA/JOSE/RAA thrombus seen. Mod to severe MR. Mild to mod TR. PulmHTN 43 mmHg. Mod non-mobile plaque in aortic arch.   -TTE (1/25/24): LVEF 48%. Mod MR.    -Recent Stress echo [facility] 12/2023: negative for ischemia. - Initial EKG w/ wide complex tachycardia (likely Aflutter 1:1) w/ HR 231bpm, s/p DCCV to sinus tachycardia 130s, subsequently converted to Afib RVR 160s, and received IV Lopressor 5mg and Lopressor 25mg PO, and converted to NSR 70s w/ RBBB s/p Amiodarone 150mg bolus, followed by Amio gtt 1mg/kg, decreased to 0.5 mg/kg given sinus jennifer (HR 40s-50s), now dc'd  - s/p ablation for typical aflutter. As per EP, afib induced during procedure requiring cardioversion. F/u CTA of coronaries prior to discharge to aid in decision regarding antiarrhythmic therapy.   - NAN (1/25/24): LVEF 40-45% with global hypokinesis. No LA/RA/JOSE/RAA thrombus seen. Mod to severe MR. Mild to mod TR. PulmHTN 43 mmHg. Mod non-mobile plaque in aortic arch.   -TTE (1/25/24): LVEF 48%. Mod MR.    -Recent Stress echo [facility] 12/2023: negative for ischemia.

## 2024-01-29 NOTE — DISCHARGE NOTE PROVIDER - HOSPITAL COURSE
79 y/o male, w/ PMHx HTN, HLD, recent [facility] hospitalization (1/24/24-1/26/24) for new onset Aflutter RVR and HFmrEF, s/p NAN/DCCV 1/25 - refused ablation, presents c/o palpitations and felt a very fast pulse. Pt reports feeling USOH after discharged from hospital, he woke up this AM, routinely checked his pulse and noted it to be very fast, then felt mild palpitations. Reports have not filled Toprol 50mg 2/2 not available at his pharmacy for a few days. Denies chest pain, sob, lightheadedness, dizziness, fever, chills, recent URI symptoms.     In ED: initial EKG noting wide complex tachycardia (likely Aflutter 1:1) w/ HR 231bpm, /79, R 18, SpO2 100% RA, T 98F. Pt underwent DCCV to sinus tachycardia 130s, subsequently converted to Afib RVR 160s, and received IV Lopressor 5mg and Lopressor 25mg PO, Amio 150mg bolus and converted to NSR 70s w/ RBBB.   CXR unremarkable. Labs s/f Trop neg x 1, , Mg 1.8. Other Meds given Fentanyl 50mcg IV prior to DCCV. Admitted to cardiac tele for wide complex tachycardia and EP eval.     Patient seen by EP and agreed to ablation, patient s/p Aflutter Ablation on 1/29/2024 79 y/o male, w/ PMHx HTN, HLD, recent [facility] hospitalization (1/24/24-1/26/24) for new onset Aflutter RVR and HFmrEF, s/p NAN/DCCV 1/25 (refused ablation at the time) who presents to [facility] ED complaining of palpitations onset this morning. He adds that he hadn't picke dup his Toprol 50mg qd after his discharge as they said it wouldn't be available for a few days. In ED: initial EKG noting wide complex tachycardia (likely Aflutter 1:1) w/ HR 231bpm, vitals otherwise stable. Pt underwent DCCV to sinus tachycardia 130s, subsequently converted to Afib RVR 160s, and received IV Lopressor 5mg and Lopressor 25mg PO, Amio 150mg bolus and converted to NSR 70s w/ RBBB. Pt was seen by EP on 1/29/24 and is now s/p Aflutter ablation on 1/29/2024. Prior to discharge, pt underwent CCTA per EP to aid in decision regarding antiarrhythmic therapy _____     Pt seen and examined at bedside this AM without any complaints or events overnight, VSS, labs and telemetry reviewed and pt stable for discharge as discussed with Dr. Sandoval. Pt has received appropriate discharge instructions, including medication regimen, access site management and follow up with Dr. Valentine in 1-2 weeks.    Discharge medications: Entresto 24/26mg BID, Eliquis 5mg BID, Farxiga 10mg QD, Lipitor 20mg QHS, ___    Pt discharge copies detail cardiovascular history, medications, testing/treatments, OR patient has created a portal account and instructed to provide their records at their 1st appointment.   77 y/o male, w/ PMHx HTN, HLD, recent [facility] hospitalization (1/24/24-1/26/24) for new onset Aflutter RVR and HFmrEF, s/p NAN/DCCV 1/25 (refused ablation at the time) who presents to [facility] ED complaining of palpitations onset this morning. He adds that he hadn't picke dup his Toprol 50mg qd after his discharge as they said it wouldn't be available for a few days. In ED: initial EKG noting wide complex tachycardia (likely Aflutter 1:1) w/ HR 231bpm, vitals otherwise stable. Pt underwent DCCV to sinus tachycardia 130s, subsequently converted to Afib RVR 160s, and received IV Lopressor 5mg and Lopressor 25mg PO, Amio 150mg bolus and converted to NSR 70s w/ RBBB. Pt was seen by EP on 1/29/24 and is now s/p Aflutter ablation on 1/29/2024. Prior to discharge, pt underwent CCTA per EP to aid in decision regarding antiarrhythmic therapy _____     Pt seen and examined at bedside this AM without any complaints or events overnight, VSS, labs and telemetry reviewed and pt stable for discharge as discussed with Dr. Sandoval. Pt has received appropriate discharge instructions, including medication regimen, access site management and will follow up with Dr. Patrick 2/28 at 11:30.     Discharge medications: Entresto 24/26mg BID, Eliquis 5mg BID, Farxiga 10mg QD, Lipitor 20mg QHS, ___    Pt discharge copies detail cardiovascular history, medications, testing/treatments, OR patient has created a portal account and instructed to provide their records at their 1st appointment.   79 y/o male, w/ PMHx HTN, HLD, recent [facility] hospitalization (1/24/24-1/26/24) for new onset Aflutter RVR and HFmrEF, s/p NAN/DCCV 1/25 (refused ablation at the time) who presents to [facility] ED complaining of palpitations onset this morning. He adds that he hadn't picke dup his Toprol 50mg qd after his discharge as they said it wouldn't be available for a few days. In ED: initial EKG noting wide complex tachycardia (likely Aflutter 1:1) w/ HR 231bpm, vitals otherwise stable. Pt underwent DCCV to sinus tachycardia 130s, subsequently converted to Afib RVR 160s, and received IV Lopressor 5mg and Lopressor 25mg PO, Amio 150mg bolus and converted to NSR 70s w/ RBBB. Pt was seen by EP on 1/29/24 and is now s/p Aflutter ablation on 1/29/2024. Prior to discharge, pt underwent CCTA per EP to aid in decision regarding antiarrhythmic therapy; no current recs for antiarrhythmics on discharge. Pt will follow up with Dr. Brown on 2/28 at 11:30 AM.     Pt seen and examined at bedside this AM without any complaints or events overnight, VSS, labs and telemetry reviewed and pt stable for discharge as discussed with Dr. Sandoval. Pt has received appropriate discharge instructions, including medication regimen, access site management and will follow up with EP Dr. Patrick 2/28 at 11:30 and with Dr. Eden 1-2 weeks after discharge.     Discharge medications: Entresto 24/26mg BID, Eliquis 5mg BID, Toprol 25mg QHS, Farxiga 10mg QD, Lipitor 20mg QHS, ___    Pt discharge copies detail cardiovascular history, medications, testing/treatments, OR patient has created a portal account and instructed to provide their records at their 1st appointment.   79 y/o male, w/ PMHx HTN, HLD, recent [facility] hospitalization (1/24/24-1/26/24) for new onset Aflutter RVR and HFmrEF, s/p NAN/DCCV 1/25 (refused ablation at the time) who presents to [facility] ED complaining of palpitations onset this morning. He adds that he hadn't picke dup his Toprol 50mg qd after his discharge as they said it wouldn't be available for a few days. In ED: initial EKG noting wide complex tachycardia (likely Aflutter 1:1) w/ HR 231bpm, vitals otherwise stable. Pt underwent DCCV to sinus tachycardia 130s, subsequently converted to Afib RVR 160s, and received IV Lopressor 5mg and Lopressor 25mg PO, Amio 150mg bolus and converted to NSR 70s w/ RBBB. Pt was seen by EP on 1/29/24 and is now s/p Aflutter ablation on 1/29/2024. Prior to discharge, pt underwent CCTA per EP to aid in decision regarding antiarrhythmic therapy; no current recs for antiarrhythmics on discharge. Pt was bradycardiac post ablation thus EP will be discharged on Torpol 25mg to be taken at bedtime. Pt will follow up with Dr. Brown on 2/28 at 11:30 AM.     Pt seen and examined at bedside this AM without any complaints or events overnight, VSS, labs and telemetry reviewed and pt stable for discharge as discussed with Dr. Sandoval. Pt has received appropriate discharge instructions, including medication regimen, access site management and will follow up with EP Dr. Patrick 2/28 at 11:30 and with his cardiologist Dr. Eden as scheduled on 2/5/24.     Discharge medications: Entresto 24/26mg BID, Eliquis 5mg BID, Toprol 25mg QHS, Farxiga 10mg QD, Lipitor 20mg QHS.     Pt discharge copies detail cardiovascular history, medications, testing/treatments, OR patient has created a portal account and instructed to provide their records at their 1st appointment.

## 2024-01-29 NOTE — PRE-ANESTHESIA EVALUATION ADULT - NSANTHADDINFOFT_GEN_ALL_CORE
anesthesia procedure reviewed in detail, pt. safety emphasized and reasonable risks reviewed.  The pts. prior reaction to General Anesthesia and appear as though the pt. had one of two possible reactions:  Residual partial paralysis secondary to Muscle relaxants or:  Reaction to aa medication such as Droperidol.

## 2024-01-29 NOTE — PROGRESS NOTE ADULT - PROBLEM SELECTOR PLAN 3
Euvolemic on exam. Did not require diuretics upon discharge.   -HF consulted last week, etiology likely tachy-induced CM in setting of Afib/Aflutter RVR  -Echo 1/25/24: EF 40-45%, mild conc LVH, severe dilated LA, dilated RA, mod MR  -c/w Entresto 24/26mg BID w/ hold parameters  -HOLD Toprol 50mg qd 2/2 sinus jennifer 40s  -HOLD Farxiga 10 mg qd Euvolemic on exam. Did not require diuretics upon discharge.   -HF consulted last week, etiology likely tachy-induced CM in setting of Afib/Aflutter RVR  -Echo 1/25/24: EF 40-45%, mild conc LVH, severe dilated LA, dilated RA, mod MR  -c/w Entresto 24/26mg BID w/ hold parameters  -Hold Toprol as above   -Farxiga Euvolemic on exam. Did not require diuretics upon discharge.   -HF consulted last week, etiology likely tachy-induced CM in setting of Afib/Aflutter RVR  -Echo 1/25/24: EF 40-45%, mild conc LVH, severe dilated LA, dilated RA, mod MR  -c/w Entresto 24/26mg BID w/ hold parameters  -Hold Toprol as above  -Hold Farxiga, consider restarting tomorrow -Echo 1/25/24 w/ moderate MR  -NNA 1/25/24 w/ mod- severe, eccentric, posteriorly direct MR  -Pt was recommended to follow up outpt w/ DARLINE Magallanes

## 2024-01-29 NOTE — DISCHARGE NOTE PROVIDER - PROVIDER TOKENS
PROVIDER:[TOKEN:[[id number]],FOLLOWUP:[2 weeks]] PROVIDER:[TOKEN:[[id number]],SCHEDULEDAPPT:[02/28/2024],SCHEDULEDAPPTTIME:[11:30 AM]] PROVIDER:[TOKEN:[[id number]],SCHEDULEDAPPT:[02/28/2024],SCHEDULEDAPPTTIME:[11:30 AM]],PROVIDER:[TOKEN:[[id number]],FOLLOWUP:[2 weeks]]

## 2024-01-29 NOTE — PROGRESS NOTE ADULT - PROBLEM SELECTOR PLAN 2
- Found to be in new onset Afib/Aflutter at [facility] last week.    - s/p ablation for typical aflutter today, d/c amio gtt   - F/u CTA of coronaries prior to discharge as recommended by EP.    -c/w Eliquis 5mg BID (reports compliance)   - HOLD Toprol 2/2 sinus jennifer 40s-50s - Found to be in new onset Afib/Aflutter at [facility] last week.    - s/p ablation for typical aflutter today, d/c amio gtt   - F/u CTA of coronaries prior to discharge as recommended by EP.    - c/w Eliquis 5mg BID (reports compliance)   - Holding Toprol due to sinus jennifer (40s-50s) - Found to be in new onset Afib/Aflutter at [facility] last week.    - s/p ablation for typical aflutter today as above, d/c amio gtt   - F/u CTA of coronaries prior to discharge as recommended by EP.    - c/w Eliquis 5mg BID (reports compliance)   - Holding Toprol due to sinus jennifer (40s-50s) Euvolemic on exam. Did not require diuretics upon discharge.   -HF consulted last week, etiology likely tachy-induced CM in setting of Afib/Aflutter RVR  -Echo 1/25/24: EF 40-45%, mild conc LVH, severe dilated LA, dilated RA, mod MR  -c/w Entresto 24/26mg BID w/ hold parameters  -Hold Toprol as above  -Hold Farxiga, consider restarting tomorrow

## 2024-01-29 NOTE — PROGRESS NOTE ADULT - SUBJECTIVE AND OBJECTIVE BOX
Cardiology PA Adult Progress Note    SUBJECTIVE ASSESSMENT: Pt reports no complaints or events overnight. Pt currently denies CP, SOB, palpitations, dizziness, LE edema, N/V/D, fever, chills, URI symptoms.   	  MEDICATIONS:  sacubitril 24 mG/valsartan 26 mG 1 Tablet(s) Oral two times a day  atorvastatin 20 milliGRAM(s) Oral at bedtime  apixaban 5 milliGRAM(s) Oral every 12 hours  influenza  Vaccine (HIGH DOSE) 0.7 milliLiter(s) IntraMuscular once    	  VITAL SIGNS:  T(C): 36.7 (01-29-24 @ 13:36), Max: 36.7 (01-28-24 @ 17:01)  HR: 42 (01-29-24 @ 13:36) (42 - 57)  BP: 119/67 (01-29-24 @ 13:36) (102/62 - 119/67)  RR: 18 (01-29-24 @ 13:36) (18 - 19)  SpO2: 96% (01-29-24 @ 13:36) (93% - 97%)  Wt(kg): --    I&O's Summary    28 Jan 2024 07:01  -  29 Jan 2024 07:00  --------------------------------------------------------  IN: 400.3 mL / OUT: 350 mL / NET: 50.3 mL    29 Jan 2024 07:01  -  29 Jan 2024 16:06  --------------------------------------------------------  IN: 0 mL / OUT: 500 mL / NET: -500 mL      Height (cm): 160 (01-29 @ 13:36)  Weight (kg): 69.4 (01-29 @ 13:36)  BMI (kg/m2): 27.1 (01-29 @ 13:36)  BSA (m2): 1.73 (01-29 @ 13:36)                                     PHYSICAL EXAM:  Appearance: Normal	  HEENT: Normal oral mucosa, PERRL, EOMI	  Neck: Supple, no JVD  Cardiovascular: Normal S1 S2, No murmurs  Respiratory: Lungs clear to auscultation/No Rales, Rhonchi, Wheezing	  Gastrointestinal:  Soft, Non-tender, + BS	  Skin: No rashes, No ecchymoses, No cyanosis  Extremities: Normal range of motion, No clubbing, cyanosis or edema  Vascular: Peripheral pulses palpable 2+ bilaterally  Neurologic: Non-focal  Psychiatry: A & O x 3, Mood & affect appropriate    LABS:	 	                        13.0   7.36  )-----------( 185      ( 29 Jan 2024 05:30 )             40.2     01-29    142  |  106  |  25<H>  ----------------------------<  108<H>  4.3   |  27  |  1.19    Ca    9.2      29 Jan 2024 05:30  Phos  3.2     01-28  Mg     2.3     01-29    TPro  7.2  /  Alb  4.1  /  TBili  0.8  /  DBili  x   /  AST  26  /  ALT  25  /  AlkPhos  100  01-28    PT/INR - ( 28 Jan 2024 09:21 )   PT: 13.8 sec;   INR: 1.22          PTT - ( 28 Jan 2024 09:21 )  PTT:36.5 sec Cardiology PA Adult Progress Note    SUBJECTIVE ASSESSMENT: Pt reports no complaints or events overnight. Pt currently denies CP, SOB, palpitations, dizziness, LE edema, N/V/D, fever, chills, URI symptoms.   	  MEDICATIONS:  sacubitril 24 mG/valsartan 26 mG 1 Tablet(s) Oral two times a day  atorvastatin 20 milliGRAM(s) Oral at bedtime  apixaban 5 milliGRAM(s) Oral every 12 hours  influenza  Vaccine (HIGH DOSE) 0.7 milliLiter(s) IntraMuscular once    	  VITAL SIGNS:  T(C): 36.7 (01-29-24 @ 13:36), Max: 36.7 (01-28-24 @ 17:01)  HR: 42 (01-29-24 @ 13:36) (42 - 57)  BP: 119/67 (01-29-24 @ 13:36) (102/62 - 119/67)  RR: 18 (01-29-24 @ 13:36) (18 - 19)  SpO2: 96% (01-29-24 @ 13:36) (93% - 97%)  Wt(kg): --    I&O's Summary    28 Jan 2024 07:01  -  29 Jan 2024 07:00  --------------------------------------------------------  IN: 400.3 mL / OUT: 350 mL / NET: 50.3 mL    29 Jan 2024 07:01  -  29 Jan 2024 16:06  --------------------------------------------------------  IN: 0 mL / OUT: 500 mL / NET: -500 mL      Height (cm): 160 (01-29 @ 13:36)  Weight (kg): 69.4 (01-29 @ 13:36)  BMI (kg/m2): 27.1 (01-29 @ 13:36)  BSA (m2): 1.73 (01-29 @ 13:36)                                     PHYSICAL EXAM:  Appearance: Normal	  HEENT: Normal oral mucosa, PERRL, EOMI	  Neck: Supple, no JVD  Cardiovascular: Normal S1 S2, No murmurs  Respiratory: Lungs clear to auscultation/No Rales, Rhonchi, Wheezing	  Gastrointestinal:  Soft, Non-tender, + BS	  Skin: No rashes, No ecchymoses, No cyanosis  Extremities: Normal range of motion, No clubbing, cyanosis or edema  Vascular: DP/PT 1+ B/L  Neurologic: Non-focal  Psychiatry: A & O x 3, Mood & affect appropriate    LABS:	 	                        13.0   7.36  )-----------( 185      ( 29 Jan 2024 05:30 )             40.2     01-29    142  |  106  |  25<H>  ----------------------------<  108<H>  4.3   |  27  |  1.19    Ca    9.2      29 Jan 2024 05:30  Phos  3.2     01-28  Mg     2.3     01-29    TPro  7.2  /  Alb  4.1  /  TBili  0.8  /  DBili  x   /  AST  26  /  ALT  25  /  AlkPhos  100  01-28    PT/INR - ( 28 Jan 2024 09:21 )   PT: 13.8 sec;   INR: 1.22          PTT - ( 28 Jan 2024 09:21 )  PTT:36.5 sec

## 2024-01-30 ENCOUNTER — TRANSCRIPTION ENCOUNTER (OUTPATIENT)
Age: 79
End: 2024-01-30

## 2024-01-30 VITALS
HEART RATE: 54 BPM | RESPIRATION RATE: 18 BRPM | SYSTOLIC BLOOD PRESSURE: 114 MMHG | OXYGEN SATURATION: 96 % | DIASTOLIC BLOOD PRESSURE: 66 MMHG

## 2024-01-30 PROBLEM — I48.91 UNSPECIFIED ATRIAL FIBRILLATION: Chronic | Status: ACTIVE | Noted: 2024-01-28

## 2024-01-30 LAB
ANION GAP SERPL CALC-SCNC: 7 MMOL/L — SIGNIFICANT CHANGE UP (ref 5–17)
BUN SERPL-MCNC: 16 MG/DL — SIGNIFICANT CHANGE UP (ref 7–23)
CALCIUM SERPL-MCNC: 9.4 MG/DL — SIGNIFICANT CHANGE UP (ref 8.4–10.5)
CHLORIDE SERPL-SCNC: 106 MMOL/L — SIGNIFICANT CHANGE UP (ref 96–108)
CO2 SERPL-SCNC: 28 MMOL/L — SIGNIFICANT CHANGE UP (ref 22–31)
CREAT SERPL-MCNC: 1.04 MG/DL — SIGNIFICANT CHANGE UP (ref 0.5–1.3)
EGFR: 74 ML/MIN/1.73M2 — SIGNIFICANT CHANGE UP
GLUCOSE SERPL-MCNC: 98 MG/DL — SIGNIFICANT CHANGE UP (ref 70–99)
HCT VFR BLD CALC: 43.5 % — SIGNIFICANT CHANGE UP (ref 39–50)
HGB BLD-MCNC: 14.3 G/DL — SIGNIFICANT CHANGE UP (ref 13–17)
MAGNESIUM SERPL-MCNC: 1.9 MG/DL — SIGNIFICANT CHANGE UP (ref 1.6–2.6)
MCHC RBC-ENTMCNC: 27.4 PG — SIGNIFICANT CHANGE UP (ref 27–34)
MCHC RBC-ENTMCNC: 32.9 GM/DL — SIGNIFICANT CHANGE UP (ref 32–36)
MCV RBC AUTO: 83.3 FL — SIGNIFICANT CHANGE UP (ref 80–100)
NRBC # BLD: 0 /100 WBCS — SIGNIFICANT CHANGE UP (ref 0–0)
PLATELET # BLD AUTO: 206 K/UL — SIGNIFICANT CHANGE UP (ref 150–400)
POTASSIUM SERPL-MCNC: 4.7 MMOL/L — SIGNIFICANT CHANGE UP (ref 3.5–5.3)
POTASSIUM SERPL-SCNC: 4.7 MMOL/L — SIGNIFICANT CHANGE UP (ref 3.5–5.3)
RBC # BLD: 5.22 M/UL — SIGNIFICANT CHANGE UP (ref 4.2–5.8)
RBC # FLD: 12.8 % — SIGNIFICANT CHANGE UP (ref 10.3–14.5)
SODIUM SERPL-SCNC: 141 MMOL/L — SIGNIFICANT CHANGE UP (ref 135–145)
WBC # BLD: 7.75 K/UL — SIGNIFICANT CHANGE UP (ref 3.8–10.5)
WBC # FLD AUTO: 7.75 K/UL — SIGNIFICANT CHANGE UP (ref 3.8–10.5)

## 2024-01-30 PROCEDURE — 85730 THROMBOPLASTIN TIME PARTIAL: CPT

## 2024-01-30 PROCEDURE — 83880 ASSAY OF NATRIURETIC PEPTIDE: CPT

## 2024-01-30 PROCEDURE — 99291 CRITICAL CARE FIRST HOUR: CPT

## 2024-01-30 PROCEDURE — 80048 BASIC METABOLIC PNL TOTAL CA: CPT

## 2024-01-30 PROCEDURE — 86850 RBC ANTIBODY SCREEN: CPT

## 2024-01-30 PROCEDURE — 75572 CT HRT W/3D IMAGE: CPT

## 2024-01-30 PROCEDURE — 84100 ASSAY OF PHOSPHORUS: CPT

## 2024-01-30 PROCEDURE — 84484 ASSAY OF TROPONIN QUANT: CPT

## 2024-01-30 PROCEDURE — 92960 CARDIOVERSION ELECTRIC EXT: CPT

## 2024-01-30 PROCEDURE — 80053 COMPREHEN METABOLIC PANEL: CPT

## 2024-01-30 PROCEDURE — 96374 THER/PROPH/DIAG INJ IV PUSH: CPT

## 2024-01-30 PROCEDURE — C1766: CPT

## 2024-01-30 PROCEDURE — C1894: CPT

## 2024-01-30 PROCEDURE — 85025 COMPLETE CBC W/AUTO DIFF WBC: CPT

## 2024-01-30 PROCEDURE — 85027 COMPLETE CBC AUTOMATED: CPT

## 2024-01-30 PROCEDURE — 71045 X-RAY EXAM CHEST 1 VIEW: CPT

## 2024-01-30 PROCEDURE — C1732: CPT

## 2024-01-30 PROCEDURE — 86901 BLOOD TYPING SEROLOGIC RH(D): CPT

## 2024-01-30 PROCEDURE — 75572 CT HRT W/3D IMAGE: CPT | Mod: 26

## 2024-01-30 PROCEDURE — 83735 ASSAY OF MAGNESIUM: CPT

## 2024-01-30 PROCEDURE — 93005 ELECTROCARDIOGRAM TRACING: CPT

## 2024-01-30 PROCEDURE — 86900 BLOOD TYPING SEROLOGIC ABO: CPT

## 2024-01-30 PROCEDURE — 85610 PROTHROMBIN TIME: CPT

## 2024-01-30 PROCEDURE — 36415 COLL VENOUS BLD VENIPUNCTURE: CPT

## 2024-01-30 PROCEDURE — C1730: CPT

## 2024-01-30 PROCEDURE — 96375 TX/PRO/DX INJ NEW DRUG ADDON: CPT

## 2024-01-30 PROCEDURE — C1760: CPT

## 2024-01-30 RX ORDER — METOPROLOL TARTRATE 50 MG
1 TABLET ORAL
Qty: 30 | Refills: 0
Start: 2024-01-30 | End: 2024-02-28

## 2024-01-30 RX ADMIN — SACUBITRIL AND VALSARTAN 1 TABLET(S): 24; 26 TABLET, FILM COATED ORAL at 05:53

## 2024-01-30 RX ADMIN — SACUBITRIL AND VALSARTAN 1 TABLET(S): 24; 26 TABLET, FILM COATED ORAL at 17:25

## 2024-01-30 RX ADMIN — APIXABAN 5 MILLIGRAM(S): 2.5 TABLET, FILM COATED ORAL at 05:53

## 2024-01-30 RX ADMIN — APIXABAN 5 MILLIGRAM(S): 2.5 TABLET, FILM COATED ORAL at 17:25

## 2024-01-30 NOTE — PROGRESS NOTE ADULT - SUBJECTIVE AND OBJECTIVE BOX
EPS Progress Note    S: patient sitting in his chair comfortably with no issues.  No palpitations, chest pain or SOB    O: T(C): 36.4 (01-30-24 @ 11:27), Max: 36.8 (01-29-24 @ 20:19)  HR: 51 (01-30-24 @ 11:27) (42 - 56)  BP: 120/66 (01-30-24 @ 11:27) (91/50 - 131/68)  RR: 18 (01-30-24 @ 11:27) (16 - 18)  SpO2: 96% (01-30-24 @ 11:27) (93% - 97%)       TELE:  NSR 40-60    PHYSICAL  General:  NAD        Chest:  CTA B/L, no w/r/r  Cardiac:  RRR, + s1/s2 , no m/g/r  Abdomen:   soft    Extremities: No edema,  groin no hematoma/bleeding/oozing  Skin: no rash noted, normal color and pigmentation  Psych: A&Ox3, normal affect and mood  Neuro: no deficit noted     LABS:                        14.3   7.75  )-----------( 206      ( 30 Jan 2024 10:21 )             43.5        141  |  106  |  16  ----------------------------<  98  4.7   |  28  |  1.04    Ca    9.4      30 Jan 2024 10:21  Mg     1.9     01-30            MEDICATIONS:  apixaban 5 milliGRAM(s) Oral every 12 hours  atorvastatin 20 milliGRAM(s) Oral at bedtime  influenza  Vaccine (HIGH DOSE) 0.7 milliLiter(s) IntraMuscular once  sacubitril 24 mG/valsartan 26 mG 1 Tablet(s) Oral two times a day    1< from: NAN Echo Doppler w/ Cont (01.25.24 @ 12:47) >   1. Mildly reduced left ventricular systolic function.   2. Normal right ventricular size and systolic function.   3. Biatrial enlargement.   4. No LA/RA/JOSE/RAA thrombus seen.   5. There is moderate to severe, eccentric, posteriorly directed mitral regurgitation.   6. Mild-to-moderate tricuspid regurgitation.   7. There ismild pulmonary hypertension, pulmonary artery systolic pressure is 43 mmHg.   8. No pericardial effusion.   9. There is moderate non-mobile plaque seen in the visualized portion of the descending aorta. There is moderate non-mobile plaque seen inthe visualized portion of the aortic arch.    Mitral Valve:  MV Mean Grad:   1.0 mmHg MV Area, PHT:  MVA, 2D planim: 5.21 cm²    < end of copied text >  < from: TTE Echo Complete w/o Contrast w/ Doppler (01.25.24 @ 11:01) >   1. Mildly reduced left ventricular systolic function.   2. Normal right ventricular size and systolic function.   3. Severely dilated left atrium.   4. Dilated right atrium.   5. Moderate eccentric mitral regurgitation.   6. Pulmonary artery systolic pressure is 42 mmHg.   7. No pericardial effusion.   8. No prior echo is available for comparison.       ASSESSMENT/PLAN  Mr. Pat is a 78 year-old male with HTN, HLD, systolic dysfunction, and atrial flutter/fibrillation admitted with a wide complex tachy treated with DC cardioversion. Now s/p flutter ablation.  He had afib post ablation.  Plan for CTa today to help decide if he should be discharged on an antiarrhythmic medication.  Groins soft.  He is doing well and remains on oral anticoagulation.  He can follow up with Dr. Patrick 2/28 at 11:30 [phone].  [address] [place]

## 2024-01-30 NOTE — PROGRESS NOTE ADULT - REASON FOR ADMISSION
Alcohol Screening and Brief Intervention          Deferred [x]    Completed on: 1/31/2022   Kalman Brunner, LSW
wide complex tachycardia

## 2024-01-30 NOTE — DISCHARGE NOTE NURSING/CASE MANAGEMENT/SOCIAL WORK - PATIENT PORTAL LINK FT
You can access the FollowMyHealth Patient Portal offered by [facility] by registering at the following website: [email or web address]. By joining [employer]’s FollowMyHealth portal, you will also be able to view your health information using other applications (apps) compatible with our system.

## 2024-02-01 DIAGNOSIS — I42.9 CARDIOMYOPATHY, UNSPECIFIED: ICD-10-CM

## 2024-02-01 DIAGNOSIS — I34.0 NONRHEUMATIC MITRAL (VALVE) INSUFFICIENCY: ICD-10-CM

## 2024-02-01 DIAGNOSIS — E78.5 HYPERLIPIDEMIA, UNSPECIFIED: ICD-10-CM

## 2024-02-01 DIAGNOSIS — I48.91 UNSPECIFIED ATRIAL FIBRILLATION: ICD-10-CM

## 2024-02-01 DIAGNOSIS — I50.21 ACUTE SYSTOLIC (CONGESTIVE) HEART FAILURE: ICD-10-CM

## 2024-02-01 DIAGNOSIS — I11.0 HYPERTENSIVE HEART DISEASE WITH HEART FAILURE: ICD-10-CM

## 2024-02-01 DIAGNOSIS — I48.3 TYPICAL ATRIAL FLUTTER: ICD-10-CM

## 2024-02-05 DIAGNOSIS — I45.10 UNSPECIFIED RIGHT BUNDLE-BRANCH BLOCK: ICD-10-CM

## 2024-02-05 DIAGNOSIS — I50.32 CHRONIC DIASTOLIC (CONGESTIVE) HEART FAILURE: ICD-10-CM

## 2024-02-05 DIAGNOSIS — I42.8 OTHER CARDIOMYOPATHIES: ICD-10-CM

## 2024-02-05 DIAGNOSIS — I48.3 TYPICAL ATRIAL FLUTTER: ICD-10-CM

## 2024-02-05 DIAGNOSIS — R00.1 BRADYCARDIA, UNSPECIFIED: ICD-10-CM

## 2024-02-05 DIAGNOSIS — E78.5 HYPERLIPIDEMIA, UNSPECIFIED: ICD-10-CM

## 2024-02-05 DIAGNOSIS — I27.20 PULMONARY HYPERTENSION, UNSPECIFIED: ICD-10-CM

## 2024-02-05 DIAGNOSIS — I34.0 NONRHEUMATIC MITRAL (VALVE) INSUFFICIENCY: ICD-10-CM

## 2024-02-05 DIAGNOSIS — I11.0 HYPERTENSIVE HEART DISEASE WITH HEART FAILURE: ICD-10-CM

## 2024-02-05 DIAGNOSIS — I48.92 UNSPECIFIED ATRIAL FLUTTER: ICD-10-CM

## 2024-02-05 DIAGNOSIS — I48.91 UNSPECIFIED ATRIAL FIBRILLATION: ICD-10-CM

## 2024-02-05 DIAGNOSIS — Z79.01 LONG TERM (CURRENT) USE OF ANTICOAGULANTS: ICD-10-CM

## 2024-02-28 ENCOUNTER — APPOINTMENT (OUTPATIENT)
Dept: HEART AND VASCULAR | Facility: CLINIC | Age: 79
End: 2024-02-28
Payer: MEDICARE

## 2024-02-28 ENCOUNTER — NON-APPOINTMENT (OUTPATIENT)
Age: 79
End: 2024-02-28

## 2024-02-28 VITALS
WEIGHT: 140 LBS | HEIGHT: 63 IN | BODY MASS INDEX: 24.8 KG/M2 | DIASTOLIC BLOOD PRESSURE: 60 MMHG | SYSTOLIC BLOOD PRESSURE: 106 MMHG | TEMPERATURE: 97.8 F | HEART RATE: 42 BPM

## 2024-02-28 DIAGNOSIS — Z78.9 OTHER SPECIFIED HEALTH STATUS: ICD-10-CM

## 2024-02-28 PROCEDURE — 93000 ELECTROCARDIOGRAM COMPLETE: CPT

## 2024-02-28 PROCEDURE — 99214 OFFICE O/P EST MOD 30 MIN: CPT

## 2024-02-29 ENCOUNTER — TRANSCRIPTION ENCOUNTER (OUTPATIENT)
Age: 79
End: 2024-02-29

## 2024-03-05 ENCOUNTER — APPOINTMENT (OUTPATIENT)
Dept: HEART AND VASCULAR | Facility: CLINIC | Age: 79
End: 2024-03-05

## 2024-03-05 PROBLEM — Z78.9 DOES NOT USE ILLICIT DRUGS: Status: ACTIVE | Noted: 2024-03-05

## 2024-03-05 NOTE — REVIEW OF SYSTEMS
[Negative] : Psychiatric [Fever] : no fever [Weight Gain (___ Lbs)] : no recent weight gain [Chills] : no chills [Weight Loss (___ Lbs)] : no recent weight loss [SOB] : no shortness of breath [Palpitations] : no palpitations [Syncope] : no syncope

## 2024-03-05 NOTE — DISCUSSION/SUMMARY
[EKG obtained to assist in diagnosis and management of assessed problem(s)] : EKG obtained to assist in diagnosis and management of assessed problem(s) [FreeTextEntry1] : 78 year old male with HTN, HLD, atrial flutter s/p DCCV 1/2023 (at that time refused ablation) s/p ablation 1/29/2024, who presents for follow up.  He has known afib seen in EP lab though unknown what clinical burden is.  We discussed the normal conduction system of the heart and what atrial flutter and atrial fibrillation are.  We discussed the differences and associations between these two arrhythmias.  We discussed there is also an ablation for atrial fibrillation - but this is a longer procedure, under general anesthesia and has about an 80% success rate.  He has known sinus bradycardia and mildly reduced EF (likely in the setting of rapid rates).  Will stop Metoprolol given bradyarrhythmia.  Event monitor to see if he has recurrent afib (in which case would recommend an ablation - given limitations with medications 2/2 heart rate) or possibly a pacemaker. We will place an event monitor and then he was asked to follow up.  He knows to call with any questions or concerns.

## 2024-03-05 NOTE — HISTORY OF PRESENT ILLNESS
[FreeTextEntry1] : 78 year old male with HTN, HLD, atrial flutter s/p DCCV 1/2023 (at that time refused ablation) s/p ablation 1/29/2024, who presents for follow up.  SInce ablation he is doing well.  Some fatigue.  No palpitations, chest pain, syncope, near syncope or edema.  His heart rates were rapid while in arrhythmia and he had a mildy reduced EF.  Known afib and known sinus bradycardia.  This has limited medications.     1/2024 1. Mildly reduced left ventricular systolic function. 2. Normal right ventricular size and systolic function. 3. Severely dilated left atrium. 4. Dilated right atrium. 5. Moderate eccentric mitral regurgitation. 6. Pulmonary artery systolic pressure is 42 mmHg. 7. No pericardial effusion. 8. No prior echo is available for comparison.

## 2024-03-05 NOTE — PHYSICAL EXAM
[Well Developed] : well developed [No Acute Distress] : no acute distress [Normal Conjunctiva] : normal conjunctiva [Well Nourished] : well nourished [5th Left ICS - MCL] : palpated at the 5th LICS in the midclavicular line [Bradycardia] : bradycardic [Rhythm Regular] : regular [Normal S1] : normal S1 [Normal S2] : normal S2 [Clear Lung Fields] : clear lung fields [Good Air Entry] : good air entry [No Respiratory Distress] : no respiratory distress  [Normal Gait] : normal gait [Soft] : abdomen soft [No Edema] : no edema [No Rash] : no rash [Moves all extremities] : moves all extremities [Alert and Oriented] : alert and oriented

## 2024-03-05 NOTE — ADDENDUM
[FreeTextEntry1] : I, Jose R Siddiqui, am scribing for and the presence of Dr. Brown the following sections: HPI, PMH,Family/social history, ROS, Physical Exam, Assessment / Plan.  I, Ismael Brown, personally performed the services described in the documentation, reviewed the documentation recorded by the scribe in my presence and it accurately and completely records my words and actions.

## 2024-03-13 ENCOUNTER — APPOINTMENT (OUTPATIENT)
Dept: HEART AND VASCULAR | Facility: CLINIC | Age: 79
End: 2024-03-13
Payer: MEDICARE

## 2024-03-13 PROCEDURE — 93248 EXT ECG>7D<15D REV&INTERPJ: CPT

## 2024-04-16 ENCOUNTER — APPOINTMENT (OUTPATIENT)
Dept: HEART AND VASCULAR | Facility: CLINIC | Age: 79
End: 2024-04-16
Payer: MEDICARE

## 2024-04-16 PROCEDURE — 99442: CPT | Mod: 93

## 2024-04-16 RX ORDER — METOPROLOL SUCCINATE 25 MG/1
25 TABLET, EXTENDED RELEASE ORAL
Qty: 30 | Refills: 2 | Status: DISCONTINUED | COMMUNITY
Start: 2024-02-20 | End: 2024-04-16

## 2024-04-16 NOTE — HISTORY OF PRESENT ILLNESS
[FreeTextEntry1] : 78 year old male with HTN, HLD, atrial flutter s/p DCCV 1/2023 (at that time refused ablation) s/p ablation 1/29/2024, who presents for follow up.  SInce ablation he is doing well. No palpitations, chest pain, syncope, or edema. His heart rates were rapid while in arrhythmia and he had a mildy reduced EF. Known afib and known sinus bradycardia. This has limited medications. He notes some positional lightheadedensss.  1/2024 1. Mildly reduced left ventricular systolic function. 2. Normal right ventricular size and systolic function. 3. Severely dilated left atrium. 4. Dilated right atrium. 5. Moderate eccentric mitral regurgitation. 6. Pulmonary artery systolic pressure is 42 mmHg. 7. No pericardial effusion. 8. No prior echo is available for comparison.

## 2024-04-16 NOTE — REASON FOR VISIT
[FreeTextEntry1] : As per the patients request this visit was performed using the [employer] audio platform. All components of the evaluation and management were performed per clinical routine with the exception of the in-person physical examOther available physiologic and diagnostic data were reviewed in detail, (e.g. remote monitoring reports, ambulatory vitals, results of prior testing). Verbal consent was obtained before proceeding with this encounter . TRACEY Siddiqui location - office at [facility].  The patient is located at their home.  Length of visit 15mins

## 2024-04-16 NOTE — DISCUSSION/SUMMARY
[FreeTextEntry1] : 78 year old male with HTN, HLD, atrial flutter s/p DCCV 1/2023 (at that time refused ablation) s/p ablation 1/29/2024, who presents for follow up.  We reviewed his event monitor with rates 28 (night time pause 3 seconds) - .  No afib  Short burst of atach.  He notes some lightehadedness and no syncope.  KNown ssinus pauses (night time 3 seconds) and sinus jennifer.  He will stop Toprol and was given instructions.  Repeat monitor in 6 months and he will follow up.  He knows to call with any questions or concerns.

## 2024-04-16 NOTE — REVIEW OF SYSTEMS
[Fever] : no fever [Chills] : no chills [SOB] : no shortness of breath [Dyspnea on exertion] : not dyspnea during exertion [Palpitations] : no palpitations [Syncope] : no syncope [Cough] : no cough [Dizziness] : dizziness

## 2024-04-23 ENCOUNTER — APPOINTMENT (OUTPATIENT)
Dept: HEART AND VASCULAR | Facility: CLINIC | Age: 79
End: 2024-04-23
Payer: MEDICARE

## 2024-04-23 VITALS
HEIGHT: 63 IN | DIASTOLIC BLOOD PRESSURE: 70 MMHG | BODY MASS INDEX: 24.98 KG/M2 | SYSTOLIC BLOOD PRESSURE: 122 MMHG | OXYGEN SATURATION: 97 % | HEART RATE: 64 BPM | WEIGHT: 141 LBS

## 2024-04-23 DIAGNOSIS — Z78.9 OTHER SPECIFIED HEALTH STATUS: ICD-10-CM

## 2024-04-23 DIAGNOSIS — Z80.0 FAMILY HISTORY OF MALIGNANT NEOPLASM OF DIGESTIVE ORGANS: ICD-10-CM

## 2024-04-23 DIAGNOSIS — Z82.0 FAMILY HISTORY OF EPILEPSY AND OTHER DISEASES OF THE NERVOUS SYSTEM: ICD-10-CM

## 2024-04-23 DIAGNOSIS — Z81.8 FAMILY HISTORY OF OTHER MENTAL AND BEHAVIORAL DISORDERS: ICD-10-CM

## 2024-04-23 DIAGNOSIS — Z87.891 PERSONAL HISTORY OF NICOTINE DEPENDENCE: ICD-10-CM

## 2024-04-23 PROCEDURE — G2211 COMPLEX E/M VISIT ADD ON: CPT

## 2024-04-23 PROCEDURE — 99205 OFFICE O/P NEW HI 60 MIN: CPT

## 2024-04-23 RX ORDER — ATORVASTATIN CALCIUM 20 MG/1
20 TABLET, FILM COATED ORAL
Qty: 1 | Refills: 0 | Status: ACTIVE | COMMUNITY
Start: 2024-04-23

## 2024-04-23 RX ORDER — SACUBITRIL AND VALSARTAN 24; 26 MG/1; MG/1
24-26 TABLET, FILM COATED ORAL TWICE DAILY
Qty: 180 | Refills: 3 | Status: ACTIVE | COMMUNITY
Start: 2024-04-23

## 2024-04-23 NOTE — REASON FOR VISIT
[Other: ____] : [unfilled] [FreeTextEntry1] : ======================================================================= Diagnostic Tests: -------------------------------------------------------------- EC24: marked sinus bradycardia at 41 bpm, LAFB.  -------------------------------------------------------------- Echo: 24: EF 63%, grade I diastolic dysfunction, mildly dilated LA, aortic sclerosis, moderate MR, mild-to-moderate TR.  24: NAN pre DCCV: mildly reduced LV function, dilated LA, dilated RA, moderate-to-severe MR, mild-to-moderate TR, PASP 43 mmHg.  24: EF 48%, severely dilated LA, moderate MR, PASP 42 mmHg.  -------------------------------------------------------------- EP procedures and monitors: 24 to : [employer] Pharmax extended Holter: HR 28/161, no AF, 3 ATs, 157 pauses (3.3 seconds), no block, no VT, <1% APCs, <1% PVCs.  24: typical atrial flutter ablation.  24: DCCV from atrial aflutter to sinus rhythm.

## 2024-04-23 NOTE — HISTORY OF PRESENT ILLNESS
[FreeTextEntry1] : Mr. Pat presents for initial evaluation and management of atrial flutter (s/p ablation 01/29/24), paroxysmal atrial fibrillation (noted post atrial flutter ablation), resolved systolic heart failure, mitral regurgitation, HTN, and dyslipidemia.   He is followed by a heart rhythm specialist, Ismael Brown MD.  On 01/25/24, he underwent cardioversion from atrial flutter to sinus rhythm.  On 01/29/24, he underwent typical atrial flutter ablation and had brief atrial fibrillation post ablation.  On 02/27/24, he had an echocardiogram which revealed an EF of 63%, grade I diastolic dysfunction, mildly dilated LA, aortic sclerosis, moderate MR, and mild-to-moderate TR. He wore an extended Holter monitor from 02/28/24 to 03/13/024 which revealed HR 28/51/161, no AF, 3 ATs, 157 pauses (3.3 seconds), no block, no VT, <1% APCs, and <1% PVCs. His metoprolol was subsequently discontinued to help reduce his burden of pauses.

## 2024-04-23 NOTE — ASSESSMENT
[FreeTextEntry1] : ======================================================================================= 1. Atrial flutter, typical, s/p DCCV 01/25/24, s/p ablation 01/29/24 with paroxysmal atrial fibrillation post flutter ablation: [id number] Score 4:     - continue systemic anticoagulation with Eliquis 5mg po bid given elevated thromboembolism risk     - discussed with patient avoidance of ASA and NSAIDS as well as need for bleeding surveillance     - will consider enrolling in the LIBERXIA-AF trial (Eliquis vs. milvexian) secondary to exorbitant cost of Eliquis     - will follow LV function with serial echocardiograms  2. HTN: BP at ACC/AHA 2017 guideline target:     - continue Entresto 24/26mg po bid  3. Dyslipidemia:      - continue atorvastatin 20mg po qd       - discussed therapeutic lifestyle changes to promote improved lipid metabolism      - patient will provide copy of recent lab work from PMD's office for my review  4. Resolved systolic heart failure: likely secondary to atrial flutter:      - will follow LV function with serial echocardiograms      - continue Farxiga 10mg po qd (discussed importance of urinary hygiene to prevent perineal infections)  5. r/o MARLIN: + daytime somnolence, + HTN, + AF with pauses    - will order a home sleep study   6. Research: will consider enrolling in the LIBERXIA-AF trial (ClinicalTrials.gov Identifier: [id number]): A Phase 3, Randomized, Double-Blind, Double-Dummy, Parallel Group, Active-Controlled Study to Evaluate the Efficacy and Safety of Milvexian, an Oral Factor Jacky Inhibitor, Versus Apixaban in Participants With Atrial Fibrillation (sponsor: [employer], collaborator: [employer]):        - study start date: 02/15/23     - estimated primary completion date: 11/11/26     - date subject screened:     - date subject enrolled:   I spent > 45 minutes of total time on this visit, including time spent face-to-face and non-face-to-face.  During this time, I took a relevant history and examined the patient.  I reviewed relevant portions of the medical record and formulated a differential diagnosis and plan.  I explained the relevant cardiac diagnoses to the patient, as well as the work up and management plan.  I answered all questions related to the patient's cardiac conditions.

## 2024-05-07 ENCOUNTER — APPOINTMENT (OUTPATIENT)
Dept: UROLOGY | Facility: CLINIC | Age: 79
End: 2024-05-07

## 2024-05-08 ENCOUNTER — APPOINTMENT (OUTPATIENT)
Dept: HEART AND VASCULAR | Facility: CLINIC | Age: 79
End: 2024-05-08
Payer: SUBSIDIZED

## 2024-05-08 VITALS
HEART RATE: 64 BPM | BODY MASS INDEX: 24.8 KG/M2 | SYSTOLIC BLOOD PRESSURE: 118 MMHG | WEIGHT: 140 LBS | HEIGHT: 63 IN | DIASTOLIC BLOOD PRESSURE: 75 MMHG

## 2024-05-08 PROCEDURE — G2211 COMPLEX E/M VISIT ADD ON: CPT

## 2024-05-08 PROCEDURE — 99215 OFFICE O/P EST HI 40 MIN: CPT

## 2024-05-08 NOTE — REASON FOR VISIT
[Other: ____] : [unfilled] [FreeTextEntry1] : ======================================================================= Diagnostic Tests: -------------------------------------------------------------- EC24: marked sinus bradycardia at 41 bpm, LAFB.  -------------------------------------------------------------- Echo: 24: EF 63%, grade I diastolic dysfunction, mildly dilated LA, aortic sclerosis, moderate MR, mild-to-moderate TR.  24: NAN pre DCCV: mildly reduced LV function, dilated LA, dilated RA, moderate-to-severe MR, mild-to-moderate TR, PASP 43 mmHg.  24: EF 48%, severely dilated LA, moderate MR, PASP 42 mmHg.  -------------------------------------------------------------- EP procedures and monitors: 24 to : Room[employer] extended Holter: HR 28/161, no AF, 3 ATs, 157 pauses (3.3 seconds), no block, no VT, <1% APCs, <1% PVCs.  24: typical atrial flutter ablation.  24: DCCV from atrial aflutter to sinus rhythm.

## 2024-05-08 NOTE — ASSESSMENT
[FreeTextEntry1] : ======================================================================================= 1. Atrial flutter, typical, s/p DCCV 01/25/24, s/p ablation 01/29/24 with paroxysmal atrial fibrillation post flutter ablation: [id number] Score 4:     - continue systemic anticoagulation with Eliquis 5mg po bid given elevated thromboembolism risk     - discussed with patient avoidance of ASA and NSAIDS as well as need for bleeding surveillance     - will consider enrolling in the LIBERXIA-AF trial (Eliquis vs. milvexian) secondary to exorbitant cost of Eliquis     - will follow LV function with serial echocardiograms  2. HTN: BP at ACC/AHA 2017 guideline target:     - continue Entresto 24/26mg po bid  3. Dyslipidemia:      - continue atorvastatin 20mg po qd       - discussed therapeutic lifestyle changes to promote improved lipid metabolism      - check lab work today  4. Resolved systolic heart failure: likely secondary to atrial flutter:      - will follow LV function with serial echocardiograms      - continue Farxiga 10mg po qd (discussed importance of urinary hygiene to prevent perineal infections)  5. r/o MARLIN: + daytime somnolence, + HTN, + AF with pauses    - will order a home sleep study   6. Research: will consider enrolling in the LIBERXIA-AF trial (ClinicalTrials.gov Identifier: [id number]): A Phase 3, Randomized, Double-Blind, Double-Dummy, Parallel Group, Active-Controlled Study to Evaluate the Efficacy and Safety of Milvexian, an Oral Factor Jacky Inhibitor, Versus Apixaban in Participants With Atrial Fibrillation (sponsor: [employer], collaborator: [employer]):        - study start date: 02/15/23     - estimated primary completion date: 11/11/26     - date subject screened: 05/08/24     - date subject enrolled: pending   I spent > 45 minutes of total time on this visit, including time spent face-to-face and non-face-to-face.  During this time, I took a relevant history and examined the patient.  I reviewed relevant portions of the medical record and formulated a differential diagnosis and plan.  I explained the relevant cardiac diagnoses to the patient, as well as the work up and management plan.  I answered all questions related to the patient's cardiac conditions.

## 2024-05-08 NOTE — HISTORY OF PRESENT ILLNESS
[FreeTextEntry1] : Mr. Pat presents for follow up and management of atrial flutter (s/p ablation 01/29/24), paroxysmal atrial fibrillation (noted post atrial flutter ablation), resolved systolic heart failure, mitral regurgitation, HTN, and dyslipidemia.   He is followed by a heart rhythm specialist, Ismael Brown MD.  On 01/25/24, he underwent cardioversion from atrial flutter to sinus rhythm.  On 01/29/24, he underwent typical atrial flutter ablation and had brief atrial fibrillation post ablation.  On 02/27/24, he had an echocardiogram which revealed an EF of 63%, grade I diastolic dysfunction, mildly dilated LA, aortic sclerosis, moderate MR, and mild-to-moderate TR. He wore an extended Holter monitor from 02/28/24 to 03/13/024 which revealed HR 28/51/161, no AF, 3 ATs, 157 pauses (3.3 seconds), no block, no VT, <1% APCs, and <1% PVCs. His metoprolol was subsequently discontinued to help reduce his burden of pauses.  Today, 05/08/24, he is being screended for the LIBERXIA-AF trial (ClinicalTrials.gov Identifier: [id number]): A Phase 3, Randomized, Double-Blind, Double-Dummy, Parallel Group, Active-Controlled Study to Evaluate the Efficacy and Safety of Milvexian, an Oral Factor Jacky Inhibitor, Versus Apixaban in Participants With Atrial Fibrillation (sponsor: [employer], collaborator: [employer]).  He will be taking a driving trip with his two dogs to [place] for a dog herding exhibition.

## 2024-05-09 LAB
ALBUMIN SERPL ELPH-MCNC: 4.6 G/DL
ALP BLD-CCNC: 91 U/L
ALT SERPL-CCNC: 20 U/L
ANION GAP SERPL CALC-SCNC: 13 MMOL/L
AST SERPL-CCNC: 27 U/L
BASOPHILS # BLD AUTO: 0.06 K/UL
BASOPHILS NFR BLD AUTO: 0.9 %
BILIRUB SERPL-MCNC: 0.5 MG/DL
BUN SERPL-MCNC: 25 MG/DL
CALCIUM SERPL-MCNC: 10.1 MG/DL
CHLORIDE SERPL-SCNC: 104 MMOL/L
CHOLEST SERPL-MCNC: 157 MG/DL
CO2 SERPL-SCNC: 25 MMOL/L
CREAT SERPL-MCNC: 1.05 MG/DL
EGFR: 73 ML/MIN/1.73M2
EOSINOPHIL # BLD AUTO: 0.11 K/UL
EOSINOPHIL NFR BLD AUTO: 1.6 %
ESTIMATED AVERAGE GLUCOSE: 111 MG/DL
GLUCOSE SERPL-MCNC: 85 MG/DL
HBA1C MFR BLD HPLC: 5.5 %
HCT VFR BLD CALC: 44.6 %
HDLC SERPL-MCNC: 69 MG/DL
HGB BLD-MCNC: 14.1 G/DL
IMM GRANULOCYTES NFR BLD AUTO: 0.3 %
LDLC SERPL DIRECT ASSAY-MCNC: 82 MG/DL
LYMPHOCYTES # BLD AUTO: 1.53 K/UL
LYMPHOCYTES NFR BLD AUTO: 22.6 %
MAN DIFF?: NORMAL
MCHC RBC-ENTMCNC: 28.3 PG
MCHC RBC-ENTMCNC: 31.6 GM/DL
MCV RBC AUTO: 89.6 FL
MONOCYTES # BLD AUTO: 0.62 K/UL
MONOCYTES NFR BLD AUTO: 9.2 %
NEUTROPHILS # BLD AUTO: 4.42 K/UL
NEUTROPHILS NFR BLD AUTO: 65.4 %
PLATELET # BLD AUTO: 206 K/UL
POTASSIUM SERPL-SCNC: 4.8 MMOL/L
PROT SERPL-MCNC: 7.1 G/DL
RBC # BLD: 4.98 M/UL
RBC # FLD: 14 %
SODIUM SERPL-SCNC: 142 MMOL/L
TRIGL SERPL-MCNC: 56 MG/DL
TSH SERPL-ACNC: 1.95 UIU/ML
WBC # FLD AUTO: 6.76 K/UL

## 2024-05-15 ENCOUNTER — APPOINTMENT (OUTPATIENT)
Dept: HEART AND VASCULAR | Facility: CLINIC | Age: 79
End: 2024-05-15
Payer: SUBSIDIZED

## 2024-05-15 ENCOUNTER — NON-APPOINTMENT (OUTPATIENT)
Age: 79
End: 2024-05-15

## 2024-05-15 VITALS
HEART RATE: 55 BPM | DIASTOLIC BLOOD PRESSURE: 67 MMHG | HEIGHT: 63 IN | BODY MASS INDEX: 24.8 KG/M2 | SYSTOLIC BLOOD PRESSURE: 107 MMHG | WEIGHT: 140 LBS | OXYGEN SATURATION: 97 %

## 2024-05-15 PROCEDURE — 93000 ELECTROCARDIOGRAM COMPLETE: CPT

## 2024-05-15 PROCEDURE — 99214 OFFICE O/P EST MOD 30 MIN: CPT

## 2024-05-15 PROCEDURE — G2211 COMPLEX E/M VISIT ADD ON: CPT

## 2024-05-15 RX ORDER — ASCORBIC ACID 500 MG
500 TABLET ORAL
Refills: 0 | Status: ACTIVE | COMMUNITY

## 2024-05-15 RX ORDER — PSYLLIUM HUSK 0.4 G
CAPSULE ORAL
Refills: 0 | Status: ACTIVE | COMMUNITY

## 2024-05-15 RX ORDER — DAPAGLIFLOZIN 10 MG/1
10 TABLET, FILM COATED ORAL DAILY
Qty: 90 | Refills: 3 | Status: ACTIVE | COMMUNITY
Start: 2024-04-23 | End: 1900-01-01

## 2024-06-05 NOTE — REASON FOR VISIT
[Other: ____] : [unfilled] [FreeTextEntry1] : ======================================================================= Diagnostic Tests: -------------------------------------------------------------- EC/15/24: sinus bradycardia at 48 bpm, LAFB.  24: marked sinus bradycardia at 41 bpm, LAFB.  -------------------------------------------------------------- Echo: 24: EF 63%, grade I diastolic dysfunction, mildly dilated LA, aortic sclerosis, moderate MR, mild-to-moderate TR.  24: NAN pre DCCV: mildly reduced LV function, dilated LA, dilated RA, moderate-to-severe MR, mild-to-moderate TR, PASP 43 mmHg.  24: EF 48%, severely dilated LA, moderate MR, PASP 42 mmHg.  -------------------------------------------------------------- EP procedures and monitors: 24 to : [employer]Dx extended Holter: HR 28/161, no AF, 3 ATs, 157 pauses (3.3 seconds), no block, no VT, <1% APCs, <1% PVCs.  24: typical atrial flutter ablation.  24: DCCV from atrial aflutter to sinus rhythm.

## 2024-06-05 NOTE — ASSESSMENT
[FreeTextEntry1] : ======================================================================================= 1. Atrial flutter, typical, s/p DCCV 01/25/24, s/p ablation 01/29/24 with paroxysmal atrial fibrillation post flutter ablation: [id number] Score 4:     - continue systemic anticoagulation with Eliquis 5mg po bid given elevated thromboembolism risk     - discussed with patient avoidance of ASA and NSAIDS as well as need for bleeding surveillance     - will consider enrolling in the LIBERXIA-AF trial (Eliquis vs. milvexian) secondary to exorbitant cost of Eliquis     - will follow LV function with serial echocardiograms  2. HTN: BP at ACC/AHA 2017 guideline target:     - continue Entresto 24/26mg po bid  3. Dyslipidemia: LDL 82 (05/09/24):       - continue atorvastatin 20mg po qd       - discussed therapeutic lifestyle changes to promote improved lipid metabolism  4. Resolved systolic heart failure, HFpEF: likely secondary to atrial flutter: NYHA I:       - will follow LV function with serial echocardiograms      - continue Farxiga 10mg po qd (discussed importance of urinary hygiene to prevent perineal infections)  5. r/o MARLIN: + daytime somnolence, + HTN, + AF with pauses    - will order a home sleep study (ordered last vist)  6. Research: will consider enrolling in the LIBERXIA-AF trial (ClinicalTrials.gov Identifier: [id number]): A Phase 3, Randomized, Double-Blind, Double-Dummy, Parallel Group, Active-Controlled Study to Evaluate the Efficacy and Safety of Milvexian, an Oral Factor Jacky Inhibitor, Versus Apixaban in Participants With Atrial Fibrillation (sponsor: [employer], collaborator: [employer]):        - study start date: 02/15/23     - estimated primary completion date: 11/11/26     - date subject screened: 05/08/24     - date subject enrolled: 05/15/24   I spent > 45 minutes of total time on this visit, including time spent face-to-face and non-face-to-face.  During this time, I took a relevant history and examined the patient.  I reviewed relevant portions of the medical record and formulated a differential diagnosis and plan.  I explained the relevant cardiac diagnoses to the patient, as well as the work up and management plan.  I answered all questions related to the patient's cardiac conditions.

## 2024-06-05 NOTE — HISTORY OF PRESENT ILLNESS
[FreeTextEntry1] : Mr. Pat presents for follow up and management of atrial flutter (s/p ablation 01/29/24), paroxysmal atrial fibrillation (noted post atrial flutter ablation), resolved systolic heart failure, mitral regurgitation, HTN, and dyslipidemia.   He is followed by a heart rhythm specialist, Ismael Brown MD.  On 01/25/24, he underwent cardioversion from atrial flutter to sinus rhythm.  On 01/29/24, he underwent typical atrial flutter ablation and had brief atrial fibrillation post ablation.  On 02/27/24, he had an echocardiogram which revealed an EF of 63%, grade I diastolic dysfunction, mildly dilated LA, aortic sclerosis, moderate MR, and mild-to-moderate TR. He wore an extended Holter monitor from 02/28/24 to 03/13/024 which revealed HR 28/51/161, no AF, 3 ATs, 157 pauses (3.3 seconds), no block, no VT, <1% APCs, and <1% PVCs. His metoprolol was subsequently discontinued to help reduce his burden of pauses.  Today, 05/08/24, he is being screended for the LIBERXIA-AF trial (ClinicalTrials.gov Identifier: [id number]): A Phase 3, Randomized, Double-Blind, Double-Dummy, Parallel Group, Active-Controlled Study to Evaluate the Efficacy and Safety of Milvexian, an Oral Factor Jacky Inhibitor, Versus Apixaban in Participants With Atrial Fibrillation (sponsor: [employer], collaborator: [employer]).  He will be taking a driving trip with his two dogs to [place] for a dog herding exhibition.  He has a nodule in the right popliteal fossa and will have an X-ray of it tomorrow.

## 2024-06-19 ENCOUNTER — APPOINTMENT (OUTPATIENT)
Dept: HEART AND VASCULAR | Facility: CLINIC | Age: 79
End: 2024-06-19
Payer: SUBSIDIZED

## 2024-06-19 VITALS
OXYGEN SATURATION: 97 % | BODY MASS INDEX: 24.63 KG/M2 | HEART RATE: 52 BPM | SYSTOLIC BLOOD PRESSURE: 101 MMHG | HEIGHT: 63 IN | DIASTOLIC BLOOD PRESSURE: 60 MMHG | WEIGHT: 139 LBS

## 2024-06-19 DIAGNOSIS — I10 ESSENTIAL (PRIMARY) HYPERTENSION: ICD-10-CM

## 2024-06-19 DIAGNOSIS — E78.5 HYPERLIPIDEMIA, UNSPECIFIED: ICD-10-CM

## 2024-06-19 DIAGNOSIS — I48.92 UNSPECIFIED ATRIAL FLUTTER: ICD-10-CM

## 2024-06-19 DIAGNOSIS — I50.30 UNSPECIFIED DIASTOLIC (CONGESTIVE) HEART FAILURE: ICD-10-CM

## 2024-06-19 DIAGNOSIS — I48.0 PAROXYSMAL ATRIAL FIBRILLATION: ICD-10-CM

## 2024-06-19 DIAGNOSIS — G47.33 OBSTRUCTIVE SLEEP APNEA (ADULT) (PEDIATRIC): ICD-10-CM

## 2024-06-19 PROCEDURE — G2211 COMPLEX E/M VISIT ADD ON: CPT

## 2024-06-19 PROCEDURE — 99214 OFFICE O/P EST MOD 30 MIN: CPT

## 2024-06-19 NOTE — ASSESSMENT
[FreeTextEntry1] : ======================================================================================= 1. Atrial flutter, typical, s/p DCCV 01/25/24, s/p ablation 01/29/24 with paroxysmal atrial fibrillation post flutter ablation: [id number] Score 4:     - continue systemic anticoagulation (either milvexian or apixaban per randomization assignment in the LIBREXIA-AF trial)     - discussed with patient avoidance of ASA and NSAIDS as well as need for bleeding surveillance     - will follow LV function with serial echocardiograms  2. HTN: BP at ACC/AHA 2017 guideline target:     - continue Entresto 24/26mg po bid  3. Dyslipidemia: LDL 82 (05/09/24):       - continue atorvastatin 20mg po qd       - discussed therapeutic lifestyle changes to promote improved lipid metabolism  4. Resolved systolic heart failure, HFpEF: likely secondary to atrial flutter: NYHA I:       - will follow LV function with serial echocardiograms      - continue Farxiga 10mg po qd (discussed importance of urinary hygiene to prevent perineal infections)  5. r/o MARLIN: + daytime somnolence, + HTN, + AF with pauses    - will order a home sleep study (ordered last vist)  6. Research: will consider enrolling in the LIBERXIA-AF trial (ClinicalTrials.gov Identifier: [id number]): A Phase 3, Randomized, Double-Blind, Double-Dummy, Parallel Group, Active-Controlled Study to Evaluate the Efficacy and Safety of Milvexian, an Oral Factor Jacky Inhibitor, Versus Apixaban in Participants With Atrial Fibrillation (sponsor: [employer], collaborator: [employer]):        - study start date: 02/15/23     - estimated primary completion date: 11/11/26     - date subject screened: 05/08/24     - date subject enrolled: 05/15/24     - no adverse effects of study medication

## 2024-06-19 NOTE — REASON FOR VISIT
[Other: ____] : [unfilled] [FreeTextEntry1] : ======================================================================= Diagnostic Tests: -------------------------------------------------------------- EC/15/24: sinus bradycardia at 48 bpm, LAFB.  24: marked sinus bradycardia at 41 bpm, LAFB.  -------------------------------------------------------------- Echo: 24: EF 63%, grade I diastolic dysfunction, mildly dilated LA, aortic sclerosis, moderate MR, mild-to-moderate TR.  24: NAN pre DCCV: mildly reduced LV function, dilated LA, dilated RA, moderate-to-severe MR, mild-to-moderate TR, PASP 43 mmHg.  24: EF 48%, severely dilated LA, moderate MR, PASP 42 mmHg.  -------------------------------------------------------------- EP procedures and monitors: 24 to : [employer]Dx extended Holter: HR 28/161, no AF, 3 ATs, 157 pauses (3.3 seconds), no block, no VT, <1% APCs, <1% PVCs.  24: typical atrial flutter ablation.  24: DCCV from atrial aflutter to sinus rhythm.

## 2024-06-19 NOTE — HISTORY OF PRESENT ILLNESS
[FreeTextEntry1] : Mr. Pat presents for follow up and management of atrial flutter (s/p ablation 01/29/24), paroxysmal atrial fibrillation (noted post atrial flutter ablation), resolved systolic heart failure, mitral regurgitation, HTN, and dyslipidemia.   He is followed by a heart rhythm specialist, Ismael Brown MD.  On 01/25/24, he underwent cardioversion from atrial flutter to sinus rhythm.  On 01/29/24, he underwent typical atrial flutter ablation and had brief atrial fibrillation post ablation.  On 02/27/24, he had an echocardiogram which revealed an EF of 63%, grade I diastolic dysfunction, mildly dilated LA, aortic sclerosis, moderate MR, and mild-to-moderate TR. He wore an extended Holter monitor from 02/28/24 to 03/13/024 which revealed HR 28/51/161, no AF, 3 ATs, 157 pauses (3.3 seconds), no block, no VT, <1% APCs, and <1% PVCs. His metoprolol was subsequently discontinued to help reduce his burden of pauses.  Today, 05/08/24, he is being screened for the LIBERXIA-AF trial (ClinicalTrials.gov Identifier: [id number]): A Phase 3, Randomized, Double-Blind, Double-Dummy, Parallel Group, Active-Controlled Study to Evaluate the Efficacy and Safety of Milvexian, an Oral Factor Jacky Inhibitor, Versus Apixaban in Participants With Atrial Fibrillation (sponsor: [employer], collaborator: [employer]).  Today, 06/19/24, he is having his week-4 research visit.  He denies bleeding or any adverse effect of the anticoagulation regimen.

## 2024-08-21 ENCOUNTER — APPOINTMENT (OUTPATIENT)
Dept: HEART AND VASCULAR | Facility: CLINIC | Age: 79
End: 2024-08-21
Payer: SUBSIDIZED

## 2024-08-21 VITALS
WEIGHT: 145 LBS | HEIGHT: 63 IN | DIASTOLIC BLOOD PRESSURE: 63 MMHG | SYSTOLIC BLOOD PRESSURE: 110 MMHG | TEMPERATURE: 97.8 F | BODY MASS INDEX: 25.69 KG/M2 | HEART RATE: 50 BPM | OXYGEN SATURATION: 97 %

## 2024-08-21 VITALS
DIASTOLIC BLOOD PRESSURE: 63 MMHG | HEART RATE: 47 BPM | HEIGHT: 63 IN | BODY MASS INDEX: 25.69 KG/M2 | WEIGHT: 145 LBS | OXYGEN SATURATION: 97 % | SYSTOLIC BLOOD PRESSURE: 110 MMHG

## 2024-08-21 DIAGNOSIS — I48.0 PAROXYSMAL ATRIAL FIBRILLATION: ICD-10-CM

## 2024-08-21 DIAGNOSIS — E78.5 HYPERLIPIDEMIA, UNSPECIFIED: ICD-10-CM

## 2024-08-21 DIAGNOSIS — I50.30 UNSPECIFIED DIASTOLIC (CONGESTIVE) HEART FAILURE: ICD-10-CM

## 2024-08-21 DIAGNOSIS — G47.33 OBSTRUCTIVE SLEEP APNEA (ADULT) (PEDIATRIC): ICD-10-CM

## 2024-08-21 DIAGNOSIS — I48.92 UNSPECIFIED ATRIAL FLUTTER: ICD-10-CM

## 2024-08-21 DIAGNOSIS — I10 ESSENTIAL (PRIMARY) HYPERTENSION: ICD-10-CM

## 2024-08-21 PROCEDURE — 99214 OFFICE O/P EST MOD 30 MIN: CPT | Mod: Q1

## 2024-08-21 PROCEDURE — 99214 OFFICE O/P EST MOD 30 MIN: CPT

## 2024-08-21 PROCEDURE — G2211 COMPLEX E/M VISIT ADD ON: CPT | Mod: Q1

## 2024-08-21 NOTE — REASON FOR VISIT
[FreeTextEntry1] : ======================================================================= Diagnostic Tests: -------------------------------------------------------------- EC/15/24: sinus bradycardia at 48 bpm, LAFB.  24: marked sinus bradycardia at 41 bpm, LAFB.  -------------------------------------------------------------- Echo: 24: EF 63%, grade I diastolic dysfunction, mildly dilated LA, aortic sclerosis, moderate MR, mild-to-moderate TR.  24: NNA pre DCCV: mildly reduced LV function, dilated LA, dilated RA, moderate-to-severe MR, mild-to-moderate TR, PASP 43 mmHg.  24: EF 48%, severely dilated LA, moderate MR, PASP 42 mmHg.  -------------------------------------------------------------- EP procedures and monitors: 24 to : Margin[employer]yDx extended Holter: HR 28/161, no AF, 3 ATs, 157 pauses (3.3 seconds), no block, no VT, <1% APCs, <1% PVCs.  24: typical atrial flutter ablation.  24: DCCV from atrial aflutter to sinus rhythm.

## 2024-08-21 NOTE — ASSESSMENT
[FreeTextEntry1] : ======================================================================================= 1. Atrial flutter, typical, s/p DCCV 01/25/24, s/p ablation 01/29/24 with paroxysmal atrial fibrillation post flutter ablation: [id number] Score 4:     - continue systemic anticoagulation (either milvexian 100 mg po bid or apixaban 5mg po bid per randomization assignment in the LIBREXIA-AF trial)     - discussed with patient avoidance of ASA and NSAIDS as well as need for bleeding surveillance     - will follow LV function with serial echocardiograms  2. HTN: BP at ACC/AHA 2017 guideline target:     - continue Entresto 24/26mg po bid  3. Dyslipidemia: LDL 82 (05/08/24):       - continue atorvastatin 20mg po qd       - discussed therapeutic lifestyle changes to promote improved lipid metabolism      - check lab work today  4. Resolved systolic heart failure, HFpEF: likely secondary to atrial flutter: NYHA I:      - continue Entresto 24/26mg po bid      - continue Farxiga 10mg po qd (discussed importance of urinary hygiene to prevent perineal infections)     - will follow left ventricular function with serial echocardiograms  5. r/o MARLIN: + daytime somnolence, + HTN, + AF with pauses    - will order a home sleep study (re-ordered today)  6. Research: will consider enrolling in the LIBERXIA-AF trial (ClinicalTrials.gov Identifier: [id number]): A Phase 3, Randomized, Double-Blind, Double-Dummy, Parallel Group, Active-Controlled Study to Evaluate the Efficacy and Safety of Milvexian, an Oral Factor Jacky Inhibitor, Versus Apixaban in Participants With Atrial Fibrillation (sponsor: [employer], collaborator: [employer]):        - study start date: 02/15/23     - estimated primary completion date: 11/11/26     - date subject screened: 05/08/24     - date subject enrolled: 05/15/24     - no adverse effects of study medication

## 2024-08-21 NOTE — HISTORY OF PRESENT ILLNESS
[FreeTextEntry1] : Mr. Pat presents for follow up and management of atrial flutter (s/p ablation 01/29/24), paroxysmal atrial fibrillation (noted post atrial flutter ablation), resolved systolic heart failure, mitral regurgitation, HTN, and dyslipidemia.   He is followed by a heart rhythm specialist, Ismael Brown MD.  On 01/25/24, he underwent cardioversion from atrial flutter to sinus rhythm.  On 01/29/24, he underwent typical atrial flutter ablation and had brief atrial fibrillation post ablation.  On 02/27/24, he had an echocardiogram which revealed an EF of 63%, grade I diastolic dysfunction, mildly dilated LA, aortic sclerosis, moderate MR, and mild-to-moderate TR. He wore an extended Holter monitor from 02/28/24 to 03/13/024 which revealed HR 28/51/161, no AF, 3 ATs, 157 pauses (3.3 seconds), no block, no VT, <1% APCs, and <1% PVCs. His metoprolol was subsequently discontinued to help reduce his burden of pauses.  Today, 05/08/24, he is being screened for the LIBERXIA-AF trial (ClinicalTrials.gov Identifier: [id number]): A Phase 3, Randomized, Double-Blind, Double-Dummy, Parallel Group, Active-Controlled Study to Evaluate the Efficacy and Safety of Milvexian, an Oral Factor Jacky Inhibitor, Versus Apixaban in Participants With Atrial Fibrillation (sponsor: [employer], collaborator: [employer]).  Today, 08/21/24, he presents for his week-13 research visit as well as his routine clinical visit.  He denies bleeding or any adverse effect of the anticoagulation regimen.

## 2024-08-22 LAB
ALBUMIN SERPL ELPH-MCNC: 4.3 G/DL
ALP BLD-CCNC: 84 U/L
ALT SERPL-CCNC: 19 U/L
ANION GAP SERPL CALC-SCNC: 13 MMOL/L
AST SERPL-CCNC: 26 U/L
BASOPHILS # BLD AUTO: 0.07 K/UL
BASOPHILS NFR BLD AUTO: 1 %
BILIRUB SERPL-MCNC: 0.5 MG/DL
BUN SERPL-MCNC: 18 MG/DL
CALCIUM SERPL-MCNC: 8.9 MG/DL
CHLORIDE SERPL-SCNC: 103 MMOL/L
CHOLEST SERPL-MCNC: 133 MG/DL
CO2 SERPL-SCNC: 25 MMOL/L
CREAT SERPL-MCNC: 1.05 MG/DL
EGFR: 73 ML/MIN/1.73M2
EOSINOPHIL # BLD AUTO: 0.21 K/UL
EOSINOPHIL NFR BLD AUTO: 3.1 %
ESTIMATED AVERAGE GLUCOSE: 114 MG/DL
GLUCOSE SERPL-MCNC: 84 MG/DL
HBA1C MFR BLD HPLC: 5.6 %
HCT VFR BLD CALC: 42.7 %
HDLC SERPL-MCNC: 62 MG/DL
HGB BLD-MCNC: 13.6 G/DL
IMM GRANULOCYTES NFR BLD AUTO: 0.3 %
LDLC SERPL DIRECT ASSAY-MCNC: 65 MG/DL
LYMPHOCYTES # BLD AUTO: 1.65 K/UL
LYMPHOCYTES NFR BLD AUTO: 24.2 %
MAN DIFF?: NORMAL
MCHC RBC-ENTMCNC: 28.2 PG
MCHC RBC-ENTMCNC: 31.9 GM/DL
MCV RBC AUTO: 88.6 FL
MONOCYTES # BLD AUTO: 0.63 K/UL
MONOCYTES NFR BLD AUTO: 9.2 %
NEUTROPHILS # BLD AUTO: 4.24 K/UL
NEUTROPHILS NFR BLD AUTO: 62.2 %
PLATELET # BLD AUTO: 209 K/UL
POTASSIUM SERPL-SCNC: 4.5 MMOL/L
PROT SERPL-MCNC: 6.5 G/DL
RBC # BLD: 4.82 M/UL
RBC # FLD: 13.4 %
SODIUM SERPL-SCNC: 141 MMOL/L
TRIGL SERPL-MCNC: 63 MG/DL
TSH SERPL-ACNC: 3.09 UIU/ML
WBC # FLD AUTO: 6.82 K/UL

## 2024-09-25 ENCOUNTER — APPOINTMENT (OUTPATIENT)
Dept: SLEEP CENTER | Facility: HOME HEALTH | Age: 79
End: 2024-09-25

## 2024-11-06 ENCOUNTER — APPOINTMENT (OUTPATIENT)
Dept: HEART AND VASCULAR | Facility: CLINIC | Age: 79
End: 2024-11-06
Payer: SUBSIDIZED

## 2024-11-06 VITALS
WEIGHT: 156 LBS | HEART RATE: 58 BPM | SYSTOLIC BLOOD PRESSURE: 109 MMHG | OXYGEN SATURATION: 96 % | DIASTOLIC BLOOD PRESSURE: 66 MMHG | BODY MASS INDEX: 27.64 KG/M2 | HEIGHT: 63 IN

## 2024-11-06 DIAGNOSIS — I10 ESSENTIAL (PRIMARY) HYPERTENSION: ICD-10-CM

## 2024-11-06 DIAGNOSIS — I50.30 UNSPECIFIED DIASTOLIC (CONGESTIVE) HEART FAILURE: ICD-10-CM

## 2024-11-06 DIAGNOSIS — G47.33 OBSTRUCTIVE SLEEP APNEA (ADULT) (PEDIATRIC): ICD-10-CM

## 2024-11-06 DIAGNOSIS — E78.5 HYPERLIPIDEMIA, UNSPECIFIED: ICD-10-CM

## 2024-11-06 DIAGNOSIS — I48.92 UNSPECIFIED ATRIAL FLUTTER: ICD-10-CM

## 2024-11-06 DIAGNOSIS — I48.0 PAROXYSMAL ATRIAL FIBRILLATION: ICD-10-CM

## 2024-11-06 PROCEDURE — 99214 OFFICE O/P EST MOD 30 MIN: CPT

## 2024-11-06 PROCEDURE — G2212 PROLONG OUTPT/OFFICE VIS: CPT

## 2025-02-12 ENCOUNTER — APPOINTMENT (OUTPATIENT)
Dept: HEART AND VASCULAR | Facility: CLINIC | Age: 80
End: 2025-02-12
Payer: SUBSIDIZED

## 2025-02-12 VITALS
HEIGHT: 63 IN | BODY MASS INDEX: 25.16 KG/M2 | DIASTOLIC BLOOD PRESSURE: 70 MMHG | HEART RATE: 70 BPM | SYSTOLIC BLOOD PRESSURE: 102 MMHG | WEIGHT: 142 LBS | OXYGEN SATURATION: 96 %

## 2025-02-12 DIAGNOSIS — I48.92 UNSPECIFIED ATRIAL FLUTTER: ICD-10-CM

## 2025-02-12 DIAGNOSIS — E78.5 HYPERLIPIDEMIA, UNSPECIFIED: ICD-10-CM

## 2025-02-12 DIAGNOSIS — I50.30 UNSPECIFIED DIASTOLIC (CONGESTIVE) HEART FAILURE: ICD-10-CM

## 2025-02-12 DIAGNOSIS — I10 ESSENTIAL (PRIMARY) HYPERTENSION: ICD-10-CM

## 2025-02-12 DIAGNOSIS — G47.33 OBSTRUCTIVE SLEEP APNEA (ADULT) (PEDIATRIC): ICD-10-CM

## 2025-02-12 DIAGNOSIS — I48.0 PAROXYSMAL ATRIAL FIBRILLATION: ICD-10-CM

## 2025-02-12 PROCEDURE — 93000 ELECTROCARDIOGRAM COMPLETE: CPT

## 2025-02-12 PROCEDURE — 99215 OFFICE O/P EST HI 40 MIN: CPT

## 2025-02-12 PROCEDURE — G2211 COMPLEX E/M VISIT ADD ON: CPT

## 2025-04-04 ENCOUNTER — NON-APPOINTMENT (OUTPATIENT)
Age: 80
End: 2025-04-04

## 2025-04-09 ENCOUNTER — APPOINTMENT (OUTPATIENT)
Dept: HEART AND VASCULAR | Facility: CLINIC | Age: 80
End: 2025-04-09
Payer: MEDICARE

## 2025-04-09 ENCOUNTER — NON-APPOINTMENT (OUTPATIENT)
Age: 80
End: 2025-04-09

## 2025-04-09 VITALS
WEIGHT: 142 LBS | DIASTOLIC BLOOD PRESSURE: 70 MMHG | OXYGEN SATURATION: 98 % | BODY MASS INDEX: 25.16 KG/M2 | HEART RATE: 88 BPM | SYSTOLIC BLOOD PRESSURE: 102 MMHG | TEMPERATURE: 97.8 F | HEIGHT: 63 IN

## 2025-04-09 DIAGNOSIS — I10 ESSENTIAL (PRIMARY) HYPERTENSION: ICD-10-CM

## 2025-04-09 DIAGNOSIS — I48.92 UNSPECIFIED ATRIAL FLUTTER: ICD-10-CM

## 2025-04-09 DIAGNOSIS — G47.33 OBSTRUCTIVE SLEEP APNEA (ADULT) (PEDIATRIC): ICD-10-CM

## 2025-04-09 DIAGNOSIS — E78.5 HYPERLIPIDEMIA, UNSPECIFIED: ICD-10-CM

## 2025-04-09 DIAGNOSIS — I48.0 PAROXYSMAL ATRIAL FIBRILLATION: ICD-10-CM

## 2025-04-09 DIAGNOSIS — I50.30 UNSPECIFIED DIASTOLIC (CONGESTIVE) HEART FAILURE: ICD-10-CM

## 2025-04-09 PROCEDURE — G2211 COMPLEX E/M VISIT ADD ON: CPT

## 2025-04-09 PROCEDURE — 93000 ELECTROCARDIOGRAM COMPLETE: CPT

## 2025-04-09 PROCEDURE — 99215 OFFICE O/P EST HI 40 MIN: CPT

## 2025-04-10 LAB
ALBUMIN SERPL ELPH-MCNC: 4.4 G/DL
ALP BLD-CCNC: 92 U/L
ALT SERPL-CCNC: 26 U/L
ANION GAP SERPL CALC-SCNC: 14 MMOL/L
AST SERPL-CCNC: 35 U/L
BASOPHILS # BLD AUTO: 0.07 K/UL
BASOPHILS NFR BLD AUTO: 1.2 %
BILIRUB SERPL-MCNC: 0.6 MG/DL
BUN SERPL-MCNC: 16 MG/DL
CALCIUM SERPL-MCNC: 9.8 MG/DL
CHLORIDE SERPL-SCNC: 105 MMOL/L
CHOLEST SERPL-MCNC: 157 MG/DL
CO2 SERPL-SCNC: 23 MMOL/L
CREAT SERPL-MCNC: 1.07 MG/DL
EGFRCR SERPLBLD CKD-EPI 2021: 71 ML/MIN/1.73M2
EOSINOPHIL # BLD AUTO: 0.15 K/UL
EOSINOPHIL NFR BLD AUTO: 2.5 %
ESTIMATED AVERAGE GLUCOSE: 126 MG/DL
GLUCOSE SERPL-MCNC: 85 MG/DL
HBA1C MFR BLD HPLC: 6 %
HCT VFR BLD CALC: 47.8 %
HDLC SERPL-MCNC: 61 MG/DL
HGB BLD-MCNC: 15.3 G/DL
IMM GRANULOCYTES NFR BLD AUTO: 0.3 %
LDLC SERPL DIRECT ASSAY-MCNC: 80 MG/DL
LYMPHOCYTES # BLD AUTO: 1.34 K/UL
LYMPHOCYTES NFR BLD AUTO: 22.5 %
MAN DIFF?: NORMAL
MCHC RBC-ENTMCNC: 28.3 PG
MCHC RBC-ENTMCNC: 32 G/DL
MCV RBC AUTO: 88.5 FL
MONOCYTES # BLD AUTO: 0.72 K/UL
MONOCYTES NFR BLD AUTO: 12.1 %
NEUTROPHILS # BLD AUTO: 3.66 K/UL
NEUTROPHILS NFR BLD AUTO: 61.4 %
PLATELET # BLD AUTO: 252 K/UL
POTASSIUM SERPL-SCNC: 4.6 MMOL/L
PROT SERPL-MCNC: 7 G/DL
RBC # BLD: 5.4 M/UL
RBC # FLD: 13.7 %
SODIUM SERPL-SCNC: 142 MMOL/L
TRIGL SERPL-MCNC: 69 MG/DL
TSH SERPL-ACNC: 3.47 UIU/ML
WBC # FLD AUTO: 5.96 K/UL

## 2025-05-14 ENCOUNTER — APPOINTMENT (OUTPATIENT)
Dept: HEART AND VASCULAR | Facility: CLINIC | Age: 80
End: 2025-05-14
Payer: SUBSIDIZED

## 2025-05-14 VITALS
HEART RATE: 75 BPM | HEIGHT: 61 IN | WEIGHT: 142 LBS | BODY MASS INDEX: 26.81 KG/M2 | SYSTOLIC BLOOD PRESSURE: 120 MMHG | OXYGEN SATURATION: 97 % | DIASTOLIC BLOOD PRESSURE: 75 MMHG

## 2025-05-14 DIAGNOSIS — G47.33 OBSTRUCTIVE SLEEP APNEA (ADULT) (PEDIATRIC): ICD-10-CM

## 2025-05-14 DIAGNOSIS — I10 ESSENTIAL (PRIMARY) HYPERTENSION: ICD-10-CM

## 2025-05-14 DIAGNOSIS — E78.5 HYPERLIPIDEMIA, UNSPECIFIED: ICD-10-CM

## 2025-05-14 DIAGNOSIS — I48.0 PAROXYSMAL ATRIAL FIBRILLATION: ICD-10-CM

## 2025-05-14 DIAGNOSIS — I50.30 UNSPECIFIED DIASTOLIC (CONGESTIVE) HEART FAILURE: ICD-10-CM

## 2025-05-14 DIAGNOSIS — I48.92 UNSPECIFIED ATRIAL FLUTTER: ICD-10-CM

## 2025-05-14 PROCEDURE — G2211 COMPLEX E/M VISIT ADD ON: CPT

## 2025-05-14 PROCEDURE — 99214 OFFICE O/P EST MOD 30 MIN: CPT

## 2025-08-19 ENCOUNTER — NON-APPOINTMENT (OUTPATIENT)
Age: 80
End: 2025-08-19

## 2025-08-20 ENCOUNTER — OUTPATIENT (OUTPATIENT)
Dept: OUTPATIENT SERVICES | Facility: HOSPITAL | Age: 80
LOS: 1 days | End: 2025-08-20
Payer: MEDICARE

## 2025-08-20 ENCOUNTER — APPOINTMENT (OUTPATIENT)
Dept: HEART AND VASCULAR | Facility: CLINIC | Age: 80
End: 2025-08-20
Payer: MEDICARE

## 2025-08-20 ENCOUNTER — RESULT REVIEW (OUTPATIENT)
Age: 80
End: 2025-08-20

## 2025-08-20 VITALS
HEART RATE: 54 BPM | OXYGEN SATURATION: 97 % | WEIGHT: 146 LBS | DIASTOLIC BLOOD PRESSURE: 67 MMHG | SYSTOLIC BLOOD PRESSURE: 110 MMHG | BODY MASS INDEX: 27.56 KG/M2 | HEIGHT: 61 IN

## 2025-08-20 DIAGNOSIS — Z98.890 OTHER SPECIFIED POSTPROCEDURAL STATES: Chronic | ICD-10-CM

## 2025-08-20 DIAGNOSIS — I50.30 UNSPECIFIED DIASTOLIC (CONGESTIVE) HEART FAILURE: ICD-10-CM

## 2025-08-20 DIAGNOSIS — I48.92 UNSPECIFIED ATRIAL FLUTTER: ICD-10-CM

## 2025-08-20 DIAGNOSIS — E78.5 HYPERLIPIDEMIA, UNSPECIFIED: ICD-10-CM

## 2025-08-20 DIAGNOSIS — I10 ESSENTIAL (PRIMARY) HYPERTENSION: ICD-10-CM

## 2025-08-20 DIAGNOSIS — I48.0 PAROXYSMAL ATRIAL FIBRILLATION: ICD-10-CM

## 2025-08-20 DIAGNOSIS — G47.33 OBSTRUCTIVE SLEEP APNEA (ADULT) (PEDIATRIC): ICD-10-CM

## 2025-08-20 PROCEDURE — 93306 TTE W/DOPPLER COMPLETE: CPT

## 2025-08-20 PROCEDURE — G2211 COMPLEX E/M VISIT ADD ON: CPT

## 2025-08-20 PROCEDURE — 93306 TTE W/DOPPLER COMPLETE: CPT | Mod: 26

## 2025-08-20 PROCEDURE — 99214 OFFICE O/P EST MOD 30 MIN: CPT

## 2025-08-21 ENCOUNTER — NON-APPOINTMENT (OUTPATIENT)
Age: 80
End: 2025-08-21